# Patient Record
Sex: MALE | Race: WHITE | NOT HISPANIC OR LATINO | Employment: FULL TIME | URBAN - METROPOLITAN AREA
[De-identification: names, ages, dates, MRNs, and addresses within clinical notes are randomized per-mention and may not be internally consistent; named-entity substitution may affect disease eponyms.]

---

## 2017-01-30 ENCOUNTER — HOSPITAL ENCOUNTER (OUTPATIENT)
Dept: RADIOLOGY | Facility: HOSPITAL | Age: 22
Discharge: HOME | End: 2017-01-30
Attending: NURSE PRACTITIONER
Payer: COMMERCIAL

## 2017-01-30 ENCOUNTER — CLINICAL SUPPORT (OUTPATIENT)
Dept: CARDIOLOGY | Facility: CLINIC | Age: 22
End: 2017-01-30
Payer: COMMERCIAL

## 2017-01-30 ENCOUNTER — OFFICE VISIT (OUTPATIENT)
Dept: INTERNAL MEDICINE | Facility: CLINIC | Age: 22
End: 2017-01-30
Payer: COMMERCIAL

## 2017-01-30 VITALS
SYSTOLIC BLOOD PRESSURE: 118 MMHG | RESPIRATION RATE: 19 BRPM | TEMPERATURE: 98 F | WEIGHT: 313.25 LBS | BODY MASS INDEX: 43.85 KG/M2 | HEIGHT: 71 IN | HEART RATE: 72 BPM | DIASTOLIC BLOOD PRESSURE: 72 MMHG

## 2017-01-30 DIAGNOSIS — Z01.818 PREOP EXAMINATION: ICD-10-CM

## 2017-01-30 DIAGNOSIS — J45.909 UNCOMPLICATED ASTHMA, UNSPECIFIED ASTHMA SEVERITY: Chronic | ICD-10-CM

## 2017-01-30 DIAGNOSIS — E66.01 MORBID OBESITY WITH BMI OF 40.0-44.9, ADULT: ICD-10-CM

## 2017-01-30 DIAGNOSIS — E11.9 TYPE 2 DIABETES MELLITUS WITHOUT COMPLICATION, WITHOUT LONG-TERM CURRENT USE OF INSULIN: ICD-10-CM

## 2017-01-30 DIAGNOSIS — G47.33 OSA (OBSTRUCTIVE SLEEP APNEA): ICD-10-CM

## 2017-01-30 DIAGNOSIS — I10 ESSENTIAL HYPERTENSION: Chronic | ICD-10-CM

## 2017-01-30 DIAGNOSIS — S43.431A TEAR OF RIGHT GLENOID LABRUM, INITIAL ENCOUNTER: ICD-10-CM

## 2017-01-30 DIAGNOSIS — F33.1 MAJOR DEPRESSIVE DISORDER, RECURRENT EPISODE, MODERATE: Primary | ICD-10-CM

## 2017-01-30 DIAGNOSIS — G43.019 INTRACTABLE MIGRAINE WITHOUT AURA AND WITHOUT STATUS MIGRAINOSUS: ICD-10-CM

## 2017-01-30 PROCEDURE — 3074F SYST BP LT 130 MM HG: CPT | Mod: S$GLB,,, | Performed by: NURSE PRACTITIONER

## 2017-01-30 PROCEDURE — 3078F DIAST BP <80 MM HG: CPT | Mod: S$GLB,,, | Performed by: NURSE PRACTITIONER

## 2017-01-30 PROCEDURE — 99213 OFFICE O/P EST LOW 20 MIN: CPT | Mod: S$GLB,,, | Performed by: NURSE PRACTITIONER

## 2017-01-30 PROCEDURE — 93000 ELECTROCARDIOGRAM COMPLETE: CPT | Mod: S$GLB,,, | Performed by: NUCLEAR MEDICINE

## 2017-01-30 PROCEDURE — 99999 PR PBB SHADOW E&M-EST. PATIENT-LVL V: CPT | Mod: PBBFAC,,, | Performed by: NURSE PRACTITIONER

## 2017-01-30 PROCEDURE — 3060F POS MICROALBUMINURIA REV: CPT | Mod: S$GLB,,, | Performed by: NURSE PRACTITIONER

## 2017-01-30 PROCEDURE — 71020 XR CHEST PA AND LATERAL: CPT | Mod: 26,,, | Performed by: RADIOLOGY

## 2017-01-30 PROCEDURE — 71020 XR CHEST PA AND LATERAL: CPT | Mod: TC,PO

## 2017-01-30 PROCEDURE — 3046F HEMOGLOBIN A1C LEVEL >9.0%: CPT | Mod: S$GLB,,, | Performed by: NURSE PRACTITIONER

## 2017-01-30 PROCEDURE — 1159F MED LIST DOCD IN RCRD: CPT | Mod: S$GLB,,, | Performed by: NURSE PRACTITIONER

## 2017-01-30 NOTE — MR AVS SNAPSHOT
[facility] Medicine  [address] [place] [address]  Phone: [phone]  Fax: [phone]                  Wilver Saba   2017 9:40 AM   Office Visit    Description:  Male : 1995   Provider:  IVANIA Ha   Department:  [facility] - Internal Medicine           Reason for Visit     Pre-op Exam     Shoulder Pain           Diagnoses this Visit        Comments    Major depressive disorder, recurrent episode, moderate    -  Primary     TAO (obstructive sleep apnea)         Uncomplicated asthma, unspecified asthma severity         Essential hypertension         Tear of right glenoid labrum, initial encounter         Preop examination                To Do List           Future Appointments        Provider Department Dept Phone    2017 11:15 AM [facility] XR2 [facility] [phone]    2017 [facility] [phone]    2017 1:30 PM LAB, SAME DAY [facility] [phone]    2017 1:00 PM Elizabeth Lejeune, NP [facility] Pulmonary Services [phone]    10/10/2017 9:30 AM FIELDS, VISUAL-ONE [facility] Ophthalmology [phone]      Goals (5 Years of Data)     None      Follow-Up and Disposition     Return if symptoms worsen or fail to improve.      [facility]     [facility] Care [facility] - 24/ Assistance  Registered nurses in the [facility] provide clinical advisement, health education, appointment booking, and other advisory services.  Call for this free service at [phone].             Medications           Message regarding Medications     Verify the changes and/or additions to your medication regime listed below are the same as discussed with your clinician today.  If any of these changes or additions are incorrect, please notify your healthcare provider.             Verify that the below list of medications is an accurate representation of the medications you are  currently taking.  If none reported, the list may be blank. If incorrect, please contact your healthcare provider. Carry this list with you in case of emergency.           Current Medications     albuterol 90 mcg/actuation HFAA Inhale into the lungs. 2 HFA Aerosol Inhaler Inhalation Every 4 hours    amlodipine (NORVASC) 10 MG tablet Take 1 tablet (10 mg total) by mouth once daily. 1 Tablet Oral Every day    busPIRone (BUSPAR) 5 MG Tab Take 5 mg by mouth 2 (two) times daily.    clindamycin (CLEOCIN T) 1 % lotion AAA of lower legs bid    desvenlafaxine succinate (PRISTIQ) 50 MG Tb24 Take 100 mg by mouth once daily.    econazole nitrate 1 % cream AAA bid    ibuprofen-famotidine 800-26.6 mg Tab Take 1 tablet by mouth 2 (two) times daily as needed.    ketoconazole (NIZORAL) 2 % cream AAA bid    losartan-hydrochlorothiazide 100-25 mg (HYZAAR) 100-25 mg per tablet Take 1 tablet by mouth once daily.    metformin (GLUCOPHAGE) 1000 MG tablet Take by mouth. 1 Tablet Oral Twice a day .  Do not take until 6/23    miconazole NITRATE 2 % (ZEASORB AF) 2 % top powder Use two to three times daily to prevent rash    mupirocin (BACTROBAN) 2 % ointment AAA bid for 10 days    mupirocin (BACTROBAN) 2 % ointment AAA bid for 10 days    sumatriptan (IMITREX) 100 MG tablet Take 1 tab at onset of migraine.  May repeat 1 tab in 2 hours.  Limit to 3 tabs/week.    topiramate (TOPAMAX) 15 MG capsule Take 1 cap q d for 7d, then 2 caps q d for 7d, then switch to 50 mg tab    topiramate (TOPAMAX) 50 MG tablet Take 1 tablet (50 mg total) by mouth once daily.    tramadol (ULTRAM) 50 mg tablet Take 50 mg by mouth every 6 (six) hours as needed for Pain.    triamcinolone acetonide 0.025% (KENALOG) 0.025 % cream AAA bid as needed for flares.  Mild steroid.    triamcinolone acetonide 0.1% (KENALOG) 0.1 % cream Apply topically 2 (two) times daily. Apply to chest eczema    zolpidem (AMBIEN) 10 mg Tab Take 10 mg by mouth every evening.           Clinical  "Reference Information           Vital Signs - Last Recorded  Most recent update: 1/30/2017 10:20 AM by Shelly [address] LPN    BP Pulse Temp Resp    118/72 (BP Location: Right arm, Patient Position: Sitting, BP Method: Manual) 72 97.6 °F (36.4 °C) (Tympanic) 19    Ht Wt BMI    5' 11" (1.803 m) (!) 142.1 kg (313 lb 4.4 oz) 43.69 kg/m2      Blood Pressure          Most Recent Value    BP  118/72      Allergies as of 1/30/2017     Amoxicillin    Amoxicillin-pot Clavulanate    Cefprozil    Cephalosporins    Diphenhydramine    Diphenhydramine Hcl    Penicillins    Potassium Clavulanate    Latex, Natural Rubber      Immunizations Administered on Date of Encounter - 1/30/2017     None      Orders Placed During Today's Visit      Normal Orders This Visit    Ambulatory referral to Pulmonology     Future Labs/Procedures Expected by Expires    APTT  1/30/2017 3/31/2018    CBC auto differential  1/30/2017 3/31/2018    Protime-INR  1/30/2017 4/30/2017    Urinalysis  1/30/2017 4/30/2017    X-Ray Chest PA And Lateral  1/30/2017 1/30/2018    Comprehensive metabolic panel  7/29/2017 1/30/2018    EKG 12-lead  As directed 1/30/2018      "
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
## 2017-01-31 ENCOUNTER — PROCEDURE VISIT (OUTPATIENT)
Dept: PULMONOLOGY | Facility: CLINIC | Age: 22
End: 2017-01-31
Payer: COMMERCIAL

## 2017-01-31 ENCOUNTER — TELEPHONE (OUTPATIENT)
Dept: INTERNAL MEDICINE | Facility: CLINIC | Age: 22
End: 2017-01-31

## 2017-01-31 ENCOUNTER — OFFICE VISIT (OUTPATIENT)
Dept: PULMONOLOGY | Facility: CLINIC | Age: 22
End: 2017-01-31
Payer: COMMERCIAL

## 2017-01-31 VITALS
SYSTOLIC BLOOD PRESSURE: 140 MMHG | BODY MASS INDEX: 44.1 KG/M2 | HEIGHT: 71 IN | HEART RATE: 75 BPM | OXYGEN SATURATION: 98 % | DIASTOLIC BLOOD PRESSURE: 98 MMHG | RESPIRATION RATE: 18 BRPM | WEIGHT: 315 LBS

## 2017-01-31 DIAGNOSIS — G47.33 OSA (OBSTRUCTIVE SLEEP APNEA): ICD-10-CM

## 2017-01-31 DIAGNOSIS — Z01.811 PRE-OP CHEST EXAM: ICD-10-CM

## 2017-01-31 DIAGNOSIS — J45.20 MILD INTERMITTENT ASTHMA WITHOUT COMPLICATION: ICD-10-CM

## 2017-01-31 DIAGNOSIS — J45.20 MILD INTERMITTENT ASTHMA WITHOUT COMPLICATION: Primary | ICD-10-CM

## 2017-01-31 LAB
ALLENS TEST: ABNORMAL
ALLENS TEST: ABNORMAL
ALLENS TEST: NORMAL
DELSYS: ABNORMAL
DELSYS: ABNORMAL
DELSYS: NORMAL
HCO3 UR-SCNC: 24.5 MMOL/L (ref 24–28)
HCO3 UR-SCNC: 25.4 MMOL/L (ref 24–28)
HCO3 UR-SCNC: 25.4 MMOL/L (ref 24–28)
PCO2 BLDA: 33.8 MMHG (ref 35–45)
PCO2 BLDA: 34.4 MMHG (ref 35–45)
PCO2 BLDA: 39.6 MMHG (ref 35–45)
PH SMN: 7.42 [PH] (ref 7.35–7.45)
PH SMN: 7.47 [PH] (ref 7.35–7.45)
PH SMN: 7.48 [PH] (ref 7.35–7.45)
PO2 BLDA: 112 MMHG (ref 80–100)
PO2 BLDA: 114 MMHG (ref 80–100)
PO2 BLDA: 90 MMHG (ref 80–100)
POC BE: 1 MMOL/L
POC BE: 1 MMOL/L
POC BE: 2 MMOL/L
POC SATURATED O2: 97 % (ref 95–100)
POC SATURATED O2: 99 % (ref 95–100)
POC SATURATED O2: 99 % (ref 95–100)
POST FEF 25 75: 4.44 L/S (ref 4.09–5.72)
POST FET 100: 7.42 S
POST FEV1 FVC: 90 %
POST FEV1: 3.88 L (ref 4.33–5.13)
POST FIF 50: 9.55 L/S
POST FVC: 4.32 L (ref 5.24–6.2)
POST PEF: 5.88 L/S (ref 9.17–11.54)
PRE FEF 25 75: 3.24 L/S (ref 4.09–5.72)
PRE FET 100: 7.59 S
PRE FEV1 FVC: 78 %
PRE FEV1: 3.52 L (ref 4.33–5.13)
PRE FIF 50: 8.39 L/S
PRE FVC: 4.52 L (ref 5.24–6.2)
PRE PEF: 5.2 L/S (ref 9.17–11.54)
PREDICTED FEV1 FVC: 83.31 % (ref 78.48–88.15)
PREDICTED FEV1: 4.73 L (ref 4.33–5.13)
PREDICTED FVC: 5.72 L (ref 5.24–6.2)
PROVOCATION PROTOCOL: ABNORMAL
SAMPLE: ABNORMAL
SAMPLE: ABNORMAL
SAMPLE: NORMAL
SITE: ABNORMAL
SITE: ABNORMAL
SITE: NORMAL

## 2017-01-31 PROCEDURE — 36600 WITHDRAWAL OF ARTERIAL BLOOD: CPT | Mod: S$GLB,,, | Performed by: INTERNAL MEDICINE

## 2017-01-31 PROCEDURE — 94060 EVALUATION OF WHEEZING: CPT | Mod: S$GLB,,, | Performed by: INTERNAL MEDICINE

## 2017-01-31 PROCEDURE — 82803 BLOOD GASES ANY COMBINATION: CPT | Mod: S$GLB,,, | Performed by: INTERNAL MEDICINE

## 2017-01-31 PROCEDURE — 99214 OFFICE O/P EST MOD 30 MIN: CPT | Mod: S$GLB,,, | Performed by: NURSE PRACTITIONER

## 2017-01-31 PROCEDURE — 1159F MED LIST DOCD IN RCRD: CPT | Mod: S$GLB,,, | Performed by: NURSE PRACTITIONER

## 2017-01-31 PROCEDURE — 3077F SYST BP >= 140 MM HG: CPT | Mod: S$GLB,,, | Performed by: NURSE PRACTITIONER

## 2017-01-31 PROCEDURE — 99999 PR PBB SHADOW E&M-EST. PATIENT-LVL IV: CPT | Mod: PBBFAC,,, | Performed by: NURSE PRACTITIONER

## 2017-01-31 PROCEDURE — 3080F DIAST BP >= 90 MM HG: CPT | Mod: S$GLB,,, | Performed by: NURSE PRACTITIONER

## 2017-01-31 RX ORDER — ALBUTEROL SULFATE 90 UG/1
2 AEROSOL, METERED RESPIRATORY (INHALATION) EVERY 4 HOURS PRN
Qty: 18 G | Refills: 0 | Status: SHIPPED | OUTPATIENT
Start: 2017-01-31 | End: 2017-03-27 | Stop reason: SDUPTHER

## 2017-01-31 NOTE — TELEPHONE ENCOUNTER
----- Message from Theodora Nix sent at 1/31/2017  9:01 AM CST -----  Contact: sudha ( Dr Stearns )   sudha ( Dr Stearns ) is requesting a copy of pt pre op results. Pt wll have surgery on 2/1/2017            Please call sudha Stearns ) back at [phone] ( office ) [phone] ( fax)
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
## 2017-01-31 NOTE — LETTER
January 31, 2017                   [facility] - Pul[facility] Services  [address] [facility] [address]  [place] [address]  Phone: [phone]  Fax: [phone] January 31, 2017     Patient: Wilver Saba    YOB: 1995   Date of Visit: 1/31/2017       Dr. Henry:    It is my medical opinion that Wilver Saba low risk for post op complications from a pulmonary standpoint. Please have CPAP 10cm available.    If you have any questions or concerns, please don't hesitate to call.    Sincerely,        Elizabeth Lejeune, NP

## 2017-02-02 DIAGNOSIS — I10 ESSENTIAL HYPERTENSION: ICD-10-CM

## 2017-02-02 RX ORDER — AMLODIPINE BESYLATE 10 MG/1
10 TABLET ORAL DAILY
Qty: 30 TABLET | Refills: 11 | Status: SHIPPED | OUTPATIENT
Start: 2017-02-02 | End: 2017-03-28 | Stop reason: SDUPTHER

## 2017-02-02 RX ORDER — LOSARTAN POTASSIUM AND HYDROCHLOROTHIAZIDE 25; 100 MG/1; MG/1
1 TABLET ORAL DAILY
Qty: 30 TABLET | Refills: 11 | Status: SHIPPED | OUTPATIENT
Start: 2017-02-02 | End: 2017-03-28 | Stop reason: SDUPTHER

## 2017-02-02 NOTE — TELEPHONE ENCOUNTER
----- Message from Melany Pressley sent at 2/2/2017 11:39 AM CST -----  Contact: pt  Pt is calling nurse staff regarding a refill Rx for amlodipine, losartan. Pt call back be advise [phone] thanks

## 2017-03-28 DIAGNOSIS — I10 ESSENTIAL HYPERTENSION: ICD-10-CM

## 2017-03-28 RX ORDER — ALBUTEROL SULFATE 90 UG/1
AEROSOL, METERED RESPIRATORY (INHALATION)
Qty: 36 INHALER | Refills: 0 | Status: SHIPPED | OUTPATIENT
Start: 2017-03-28

## 2017-03-28 NOTE — TELEPHONE ENCOUNTER
----- Message from Theodora Nix sent at 3/28/2017  2:19 PM CDT -----  Pt is requesting a refill on all his medications. Pt states he is completely out of all his medications.              Please call pt back at [phone]        [facility] [address] [facility] - [address] - [address]  [address] [place] [address]  Phone: [phone] Fax: [phone]

## 2017-03-30 ENCOUNTER — TELEPHONE (OUTPATIENT)
Dept: INTERNAL MEDICINE | Facility: CLINIC | Age: 22
End: 2017-03-30

## 2017-03-30 NOTE — TELEPHONE ENCOUNTER
----- Message from Mehreen Conway sent at 3/30/2017  2:58 PM CDT -----  Contact: Margot - Mom  She states that his generic Metformin 1000 mgs twice a day was not called in to [facility] on [address].  They told her they never received that one.   Call Mom at [phone].                                [place]

## 2017-03-31 RX ORDER — METFORMIN HYDROCHLORIDE 1000 MG/1
TABLET ORAL
Qty: 30 TABLET | Refills: 0 | Status: SHIPPED | OUTPATIENT
Start: 2017-03-31 | End: 2017-06-23 | Stop reason: SDUPTHER

## 2017-03-31 RX ORDER — AMLODIPINE BESYLATE 10 MG/1
10 TABLET ORAL DAILY
Qty: 90 TABLET | Refills: 0 | Status: SHIPPED | OUTPATIENT
Start: 2017-03-31

## 2017-03-31 RX ORDER — LOSARTAN POTASSIUM AND HYDROCHLOROTHIAZIDE 25; 100 MG/1; MG/1
1 TABLET ORAL DAILY
Qty: 90 TABLET | Refills: 0 | Status: SHIPPED | OUTPATIENT
Start: 2017-03-31 | End: 2017-08-09 | Stop reason: SDUPTHER

## 2017-05-06 ENCOUNTER — HOSPITAL ENCOUNTER (EMERGENCY)
Facility: HOSPITAL | Age: 22
Discharge: HOME | End: 2017-05-06
Attending: EMERGENCY MEDICINE
Payer: COMMERCIAL

## 2017-05-06 VITALS
DIASTOLIC BLOOD PRESSURE: 100 MMHG | HEIGHT: 71 IN | TEMPERATURE: 98 F | BODY MASS INDEX: 42.7 KG/M2 | RESPIRATION RATE: 20 BRPM | WEIGHT: 305 LBS | SYSTOLIC BLOOD PRESSURE: 159 MMHG | OXYGEN SATURATION: 96 % | HEART RATE: 78 BPM

## 2017-05-06 DIAGNOSIS — R79.89 ELEVATED LFTS: ICD-10-CM

## 2017-05-06 DIAGNOSIS — K57.90 DIVERTICULOSIS OF INTESTINE WITHOUT BLEEDING, UNSPECIFIED INTESTINAL TRACT LOCATION: ICD-10-CM

## 2017-05-06 DIAGNOSIS — N30.10 INTERSTITIAL CYSTITIS: ICD-10-CM

## 2017-05-06 DIAGNOSIS — R10.9 ABDOMINAL PAIN, UNSPECIFIED LOCATION: Primary | ICD-10-CM

## 2017-05-06 LAB
ALBUMIN SERPL BCP-MCNC: 4 G/DL
ALP SERPL-CCNC: 20 U/L
ALT SERPL W/O P-5'-P-CCNC: 78 U/L
ANION GAP SERPL CALC-SCNC: 12 MMOL/L
AST SERPL-CCNC: 44 U/L
BASOPHILS # BLD AUTO: 0.01 K/UL
BASOPHILS NFR BLD: 0.1 %
BILIRUB SERPL-MCNC: 0.5 MG/DL
BILIRUB UR QL STRIP: NEGATIVE
BUN SERPL-MCNC: 17 MG/DL
CALCIUM SERPL-MCNC: 9.2 MG/DL
CHLORIDE SERPL-SCNC: 103 MMOL/L
CLARITY UR: CLEAR
CO2 SERPL-SCNC: 22 MMOL/L
COLOR UR: YELLOW
CREAT SERPL-MCNC: 0.8 MG/DL
DIFFERENTIAL METHOD: ABNORMAL
EOSINOPHIL # BLD AUTO: 0.1 K/UL
EOSINOPHIL NFR BLD: 1.6 %
ERYTHROCYTE [DISTWIDTH] IN BLOOD BY AUTOMATED COUNT: 12.2 %
EST. GFR  (AFRICAN AMERICAN): >60 ML/MIN/1.73 M^2
EST. GFR  (NON AFRICAN AMERICAN): >60 ML/MIN/1.73 M^2
GLUCOSE SERPL-MCNC: 100 MG/DL
GLUCOSE UR QL STRIP: NEGATIVE
HCT VFR BLD AUTO: 40.1 %
HGB BLD-MCNC: 14.4 G/DL
HGB UR QL STRIP: ABNORMAL
KETONES UR QL STRIP: NEGATIVE
LEUKOCYTE ESTERASE UR QL STRIP: NEGATIVE
LIPASE SERPL-CCNC: 18 U/L
LYMPHOCYTES # BLD AUTO: 2.4 K/UL
LYMPHOCYTES NFR BLD: 28.4 %
MCH RBC QN AUTO: 31.8 PG
MCHC RBC AUTO-ENTMCNC: 35.9 %
MCV RBC AUTO: 89 FL
MONOCYTES # BLD AUTO: 0.5 K/UL
MONOCYTES NFR BLD: 5.9 %
NEUTROPHILS # BLD AUTO: 5.4 K/UL
NEUTROPHILS NFR BLD: 64 %
NITRITE UR QL STRIP: NEGATIVE
PH UR STRIP: 6 [PH] (ref 5–8)
PLATELET # BLD AUTO: ABNORMAL K/UL
PMV BLD AUTO: ABNORMAL FL
POTASSIUM SERPL-SCNC: 4.3 MMOL/L
PROT SERPL-MCNC: 7.5 G/DL
PROT UR QL STRIP: NEGATIVE
RBC # BLD AUTO: 4.53 M/UL
SODIUM SERPL-SCNC: 137 MMOL/L
SP GR UR STRIP: 1.01 (ref 1–1.03)
URN SPEC COLLECT METH UR: ABNORMAL
UROBILINOGEN UR STRIP-ACNC: NEGATIVE EU/DL
WBC # BLD AUTO: 8.49 K/UL

## 2017-05-06 PROCEDURE — 85025 COMPLETE CBC W/AUTO DIFF WBC: CPT

## 2017-05-06 PROCEDURE — 99284 EMERGENCY DEPT VISIT MOD MDM: CPT | Mod: 25

## 2017-05-06 PROCEDURE — 96361 HYDRATE IV INFUSION ADD-ON: CPT

## 2017-05-06 PROCEDURE — 96375 TX/PRO/DX INJ NEW DRUG ADDON: CPT

## 2017-05-06 PROCEDURE — 80053 COMPREHEN METABOLIC PANEL: CPT

## 2017-05-06 PROCEDURE — 96374 THER/PROPH/DIAG INJ IV PUSH: CPT

## 2017-05-06 PROCEDURE — 25500020 PHARM REV CODE 255: Performed by: EMERGENCY MEDICINE

## 2017-05-06 PROCEDURE — 81003 URINALYSIS AUTO W/O SCOPE: CPT

## 2017-05-06 PROCEDURE — 25000003 PHARM REV CODE 250: Performed by: EMERGENCY MEDICINE

## 2017-05-06 PROCEDURE — 83690 ASSAY OF LIPASE: CPT

## 2017-05-06 PROCEDURE — 63600175 PHARM REV CODE 636 W HCPCS: Performed by: EMERGENCY MEDICINE

## 2017-05-06 RX ORDER — ONDANSETRON 2 MG/ML
4 INJECTION INTRAMUSCULAR; INTRAVENOUS
Status: COMPLETED | OUTPATIENT
Start: 2017-05-06 | End: 2017-05-06

## 2017-05-06 RX ORDER — HYDROCODONE BITARTRATE AND ACETAMINOPHEN 7.5; 325 MG/1; MG/1
1 TABLET ORAL EVERY 6 HOURS PRN
COMMUNITY

## 2017-05-06 RX ORDER — CIPROFLOXACIN 500 MG/1
500 TABLET ORAL 2 TIMES DAILY
Qty: 20 TABLET | Refills: 0 | Status: SHIPPED | OUTPATIENT
Start: 2017-05-06 | End: 2017-05-16

## 2017-05-06 RX ORDER — NAPROXEN 500 MG/1
500 TABLET ORAL 2 TIMES DAILY WITH MEALS
Qty: 20 TABLET | Refills: 0 | Status: SHIPPED | OUTPATIENT
Start: 2017-05-06

## 2017-05-06 RX ORDER — MORPHINE SULFATE 4 MG/ML
4 INJECTION, SOLUTION INTRAMUSCULAR; INTRAVENOUS
Status: COMPLETED | OUTPATIENT
Start: 2017-05-06 | End: 2017-05-06

## 2017-05-06 RX ADMIN — MORPHINE SULFATE 4 MG: 4 INJECTION, SOLUTION INTRAMUSCULAR; INTRAVENOUS at 04:05

## 2017-05-06 RX ADMIN — ONDANSETRON 4 MG: 2 INJECTION INTRAMUSCULAR; INTRAVENOUS at 04:05

## 2017-05-06 RX ADMIN — IOHEXOL 75 ML: 350 INJECTION, SOLUTION INTRAVENOUS at 05:05

## 2017-05-06 RX ADMIN — SODIUM CHLORIDE 1000 ML: 0.9 INJECTION, SOLUTION INTRAVENOUS at 04:05

## 2017-05-06 NOTE — ED AVS SNAPSHOT
[facility]  [address]  [place] [facility] [address]               Wilver Saba   2017  3:01 AM   ED    Description:  Male : 1995   Department:  [facility]            Your Care was Coordinated By:     Provider Role From To    Yuriy Smith Jr., MD Attending Provider 17 0250 --      Reason for Visit     Abdominal Pain           Diagnoses this Visit        Comments    Abdominal pain, unspecified location    -  Primary     Elevated LFTs         Interstitial cystitis         Diverticulosis of intestine without bleeding, unspecified intestinal tract location           ED Disposition     ED Disposition Condition Comment    Discharge  For URINARY TRACT INFECTION, I instructed patient to avoid holding in urine and recommended that he urinate when he feels the urge.  Also advised patient to drink plenty of liquids and reminded patient that he may need to drink more fluids than usual to  help flush out the bacteria. Instructed patient to avoid alcohol, caffeine, and citrus juices as these substances can irritate your bladder and increase your symptoms.  Also recommended that patient apply heat to lower abdomen for 20 to 30 minutes every  two hours to help decrease discomfort and pressure in the bladder.    Return to ER if symptoms persist or worsen.             To Do List           Follow-up Information     Follow up with Vin Thomson MD. Schedule an appointment as soon as possible for a visit in 1 week.    Specialty:  Family Medicine    Contact information:    [address]  [place] [address] [phone]          Follow up with [facility]. Schedule an appointment as soon as possible for a visit in 1 week.    Specialty:  Urology    Contact information:    [address] [address] [phone]    Additional information:    (off [address] [facility]) 4th floor        Follow up with [facility]  [place]    Specialty:  Emergency Medicine    Why:  As needed, If symptoms worsen    Contact information:    [address]  [facility] [address] [phone]       These Medications        Disp Refills Start End    ciprofloxacin HCl (CIPRO) 500 MG tablet 20 tablet 0 5/6/2017 5/16/2017    Take 1 tablet (500 mg total) by mouth 2 (two) times daily. - Oral    Pharmacy: [id number] - [place] - [id number] Ph #: [phone]       naproxen (NAPROSYN) 500 MG tablet 20 tablet 0 5/6/2017     Take 1 tablet (500 mg total) by mouth 2 (two) times daily with meals. - Oral    Pharmacy: [id number] [place] - [id number] [address] Ph #: [phone]         [facility]     [facility] Care Line - 24/7 Assistance  Unless otherwise directed by your provider, please contact [facility], our nurse care line that is available for 24/7 assistance.     Registered nurses in the [facility] provide: appointment scheduling, clinical advisement, health education, and other advisory services.  Call: [phone] (toll free)               Medications           Message regarding Medications     Verify the changes and/or additions to your medication regime listed below are the same as discussed with your clinician today.  If any of these changes or additions are incorrect, please notify your healthcare provider.        START taking these NEW medications        Refills    ciprofloxacin HCl (CIPRO) 500 MG tablet 0    Sig: Take 1 tablet (500 mg total) by mouth 2 (two) times daily.    Class: Print    Route: Oral    naproxen (NAPROSYN) 500 MG tablet 0    Sig: Take 1 tablet (500 mg total) by mouth 2 (two) times daily with meals.    Class: Print    Route: Oral      These medications were administered today        Dose Freq    morphine injection 4 mg 4 mg ED 1 Time    Sig: Inject 1 mL (4 mg total) into the vein ED 1 Time.    Class: Normal    Route: Intravenous     ondansetron injection 4 mg 4 mg ED 1 Time    Sig: Inject 4 mg into the vein ED 1 Time.    Class: Normal    Route: Intravenous    sodium chloride 0.9% bolus 1,000 mL 1,000 mL ED 1 Time    Sig: Inject 1,000 mLs into the vein ED 1 Time.    Class: Normal    Route: Intravenous    omnipaque 350 iohexol 75 mL 75 mL IMG once as needed    Sig: Inject 75 mLs into the vein ONCE PRN for contrast.    Class: Normal    Route: Intravenous           Verify that the below list of medications is an accurate representation of the medications you are currently taking.  If none reported, the list may be blank. If incorrect, please contact your healthcare provider. Carry this list with you in case of emergency.           Current Medications     amlodipine (NORVASC) 10 MG tablet Take 1 tablet (10 mg total) by mouth once daily. 1 Tablet Oral Every day    busPIRone (BUSPAR) 5 MG Tab Take 5 mg by mouth 2 (two) times daily.    desvenlafaxine succinate (PRISTIQ) 50 MG Tb24 Take 100 mg by mouth once daily.    econazole nitrate 1 % cream AAA bid    hydrocodone-acetaminophen 7.5-325mg (NORCO) 7.5-325 mg per tablet Take 1 tablet by mouth every 6 (six) hours as needed for Pain.    losartan-hydrochlorothiazide 100-25 mg (HYZAAR) 100-25 mg per tablet Take 1 tablet by mouth once daily.    metformin (GLUCOPHAGE) 1000 MG tablet 1 Tablet Oral Twice a day .    VENTOLIN HFA 90 mcg/actuation inhaler INHALE 2 PUFFS BY MOUTH EVERY 4 HOURS AS NEEDED FOR WHEEZING.    zolpidem (AMBIEN) 10 mg Tab Take 10 mg by mouth every evening.    ciprofloxacin HCl (CIPRO) 500 MG tablet Take 1 tablet (500 mg total) by mouth 2 (two) times daily.    clindamycin (CLEOCIN T) 1 % lotion AAA of lower legs bid    ibuprofen-famotidine 800-26.6 mg Tab Take 1 tablet by mouth 2 (two) times daily as needed.    ketoconazole (NIZORAL) 2 % cream AAA bid    miconazole NITRATE 2 % (ZEASORB AF) 2 % top powder Use two to three times daily to prevent rash    mupirocin (BACTROBAN) 2 % ointment AAA  "bid for 10 days    mupirocin (BACTROBAN) 2 % ointment AAA bid for 10 days    naproxen (NAPROSYN) 500 MG tablet Take 1 tablet (500 mg total) by mouth 2 (two) times daily with meals.    sumatriptan (IMITREX) 100 MG tablet Take 1 tab at onset of migraine.  May repeat 1 tab in 2 hours.  Limit to 3 tabs/week.    topiramate (TOPAMAX) 15 MG capsule Take 1 cap q d for 7d, then 2 caps q d for 7d, then switch to 50 mg tab    tramadol (ULTRAM) 50 mg tablet Take 50 mg by mouth every 6 (six) hours as needed for Pain.    triamcinolone acetonide 0.025% (KENALOG) 0.025 % cream AAA bid as needed for flares.  Mild steroid.    triamcinolone acetonide 0.1% (KENALOG) 0.1 % cream Apply topically 2 (two) times daily. Apply to chest eczema           Clinical Reference Information           Your Vitals Were     BP Pulse Temp Resp Height Weight    190/89 91 97.5 °F (36.4 °C) 20 5' 11" (1.803 m) 138.3 kg (305 lb)    SpO2 BMI             98% 42.54 kg/m2         Allergies as of 5/6/2017        Reactions    Amoxicillin Other (See Comments)    Amoxicillin-pot Clavulanate Other (See Comments)    Other reaction(s): Swelling  Other reaction(s): Hives  Other reaction(s): Flushing (skin)    Cefprozil Other (See Comments)    Cephalosporins     Other reaction(s): Flushing (skin)    Diphenhydramine Other (See Comments)    Diphenhydramine Hcl Other (See Comments)    Other reaction(s): Agitation    Penicillins Other (See Comments)    Other reaction(s): RED BURNING SKIN    Potassium Clavulanate Other (See Comments)    Latex, Natural Rubber Rash      Immunizations Administered on Date of Encounter - 5/6/2017     None      ED Micro, Lab, POCT     Start Ordered       Status Ordering Provider    05/06/17 0316 05/06/17 0319  CBC W/ AUTO DIFFERENTIAL  STAT      Final result     05/06/17 0316 05/06/17 0319  Comp. Metabolic Panel  STAT      Final result     05/06/17 0316 05/06/17 0319  Lipase  Once      Final result     05/06/17 0316 05/06/17 0319  Urinalysis - " Clean Catch  STAT      Final result       ED Imaging Orders     Start Ordered       Status Ordering Provider    05/06/17 0318 05/06/17 0319  CT Abdomen Pelvis With Contrast  1 time imaging      In process         Discharge Instructions         Diverticulosis    Diverticulosis means that small pouches have formed in the wall of your large intestine (colon). Most often, this problem causes no symptoms and is common as people age. But the pouches in the colon are at risk of becoming infected. When this happens, the condition is called diverticulitis. Although most people with diverticulosis never develop diverticulitis, it is still not uncommon. Rectal bleeding can also occur and in less common situations, a type of colon inflammation called colitis.  While most people do not have symptoms, some people with diverticulosis may have:  · Abdominal cramps and pain  · Bloating  · Constipation  · Change in bowel habits  Causes  The exact cause of diverticulosis (and diverticulitis) has not been proved, but a few things are associated with the condition:  · Low-fiber diet  · Constipation  · Lack of exercise  Your healthcare provider will talk with you about how to manage your condition. Diet changes may be all that are needed to help control diverticulosis and prevent progression to diverticulitis. If you develop diverticulitis, you will likely need other treatments.  Home care  You may be told to take fiber supplements daily. Fiber adds bulk to the stool so that it passes through the colon more easily. Stool softeners may be recommended. You may also be given medications for pain relief. Be sure to take all medications as directed.  In the past, people were told to avoid corn, nuts, and seeds. This is no longer necessary.  Follow these guidelines when caring for yourself at home:  · Eat unprocessed foods that are high in fiber. Whole grains, fruits, and vegetables are good choices.  · Drink 6 to 8 glasses of water every day  unless your healthcare provider has you limit how much fluid you should have.  · Watch for changes in your bowel movements. Tell your provider if you notice any changes.  · Begin an exercise program. Ask your provider how to get started. Generally, walking is the best.  · Get plenty of rest and sleep.  Follow-up care  Follow up with your healthcare provider, or as advised. Regular visits may be needed to check on your health. Sometimes special procedures such as colonoscopy, are needed after an episode of diverticulitis or blooding. Be sure to keep all your appointments.  If a stool sample was taken, or cultures were done, you should be told if they are positive, or if your treatment needs to be changed. You can call as directed for the results.  If X-rays were done, a radiologist will look at them. You will be told if there is a change in your treatment.  If antibiotics were prescribed, be sure to finish them all.  When to seek medical advice  Call your healthcare provider right away if any of these occur:  · Fever of 100.4°F (38°C) or higher, or as directed by your healthcare provider  · Severe cramps in the lower left side of the abdomen or pain that is getting worse  · Tenderness in the lower left side of the abdomen or worsening pain throughout the abdomen  · Diarrhea or constipation that doesn't get better within 24 hours  · Nausea and vomiting  · Bleeding from the rectum  Call 911  Call emergency services if any of the following occur:  · Trouble breathing  · Confusion  · Very drowsy or trouble awakening  · Fainting or loss of consciousness  · Rapid heart rate  · Chest pain  Date Last Reviewed: 12/30/2015  © 5800-6224 [employer], [employer]. [address] [place] [address], [place] [address]. All rights reserved. This information is not intended as a substitute for professional medical care. Always follow your healthcare professional's instructions.          Anatomy of the Male Urinary Tract  Your urinary tract  helps to get rid of your bodys liquid waste. The kidneys constantly filter the blood to collect unneeded chemicals and water, making urine. Urine travels through the ureters to the bladder. The bladder fills with urine, holding it until youre ready to release it. Signals from the brain tell the sphincter (muscles around the opening of the bladder) when to let urine flow out of the bladder. The urethra is the tube that carries urine from the bladder out of the body. In men, the prostate gland wraps around the urethra near the bladder.     Front view of male urinary tract.     Date Last Reviewed: 8/27/2014  © 0567-9007 [employer]. [address] [place] [address], [place] [address]. All rights reserved. This information is not intended as a substitute for professional medical care. Always follow your healthcare professional's instructions.          Your Scheduled Appointments     Oct 10, 2017  9:30 AM CDT   [facility] with [facility]ELDS, VISUAL-ONE   [facility] ([facility])    [address] [place] [address] [phone]            Oct 10, 2017 10:00 AM CDT   Established Patient Visit with Richie Gifford OD   [facility] ([facility])    [address] [place] [address] [phone]               [facility]  complies with applicable Federal civil rights laws and does not discriminate on the basis of race, color, national origin, age, disability, or sex.        Language Assistance Services     ATTENTION: Language assistance services are available, free of charge. Please call [phone].      ATENCIÓN: Si habla español, tiene a dominguez disposición servicios gratuitos de asistencia lingüística. Llame al [phone].     CHÚ Ý: N?u b?n nói Ti?ng Vi?t, có các d?ch v? h? tr? ngôn ng? mi?n phí dành cho b?n. G?i s? [phone].

## 2017-05-06 NOTE — ED PROVIDER NOTES
SCRIBE #1 NOTE: I, Efrem Rock, am scribing for, and in the presence of, Yuriy Smith Jr., MD. I have scribed the entire note.      History      Chief Complaint   Patient presents with    Abdominal Pain       Review of patient's allergies indicates:   Allergen Reactions    Amoxicillin Other (See Comments)    Amoxicillin-pot clavulanate Other (See Comments)     Other reaction(s): Swelling  Other reaction(s): Hives  Other reaction(s): Flushing (skin)    Cefprozil Other (See Comments)    Cephalosporins      Other reaction(s): Flushing (skin)    Diphenhydramine Other (See Comments)    Diphenhydramine hcl Other (See Comments)     Other reaction(s): Agitation    Penicillins Other (See Comments)     Other reaction(s): RED BURNING SKIN    Potassium clavulanate Other (See Comments)    Latex, natural rubber Rash        HPI   HPI    5/6/2017, 3:05 AM   History obtained from the patient      History of Present Illness: Wilver Saba is a 22 y.o. male patient who presents to the Emergency Department for RLQ abd pain which onset gradually 11 hours ago while cooking dinner and worsened since onset. Symptoms are constant and moderate in severity. Pain is exacerbating by movement and deep breaths and does not radiate anywhere. No other mitigating or exacerbating factors reported. No alleviating factors reported. Associated sxs include nausea. Patient denies any fever, chills, vomiting, diarrhea, constipation, hematochezia, hematuria, dysuria, urinary frequency, and all other sxs at this time. Prior Tx includes Hydrocodone 750mg PTA. Pt has hx of appy and diabetes. No further complaints or concerns at this time.       Arrival mode: Personal vehicle     PCP: Vin Thomson MD       Past Medical History:  Past Medical History:   Diagnosis Date    ADHD (attention deficit hyperactivity disorder)     Anxiety     Asthma     Depression     dr torres    Diabetes mellitus 2010     08/20/2014    DM  (diabetes mellitus) 2010    BS  115 x 1 month ago 08/25/2015    Headache     Hypertension     Migraine headache     Morbid obesity     TAO (obstructive sleep apnea)     Type 2 diabetes with nephropathy        Past Surgical History:  Past Surgical History:   Procedure Laterality Date    APPENDECTOMY      FRACTURE SURGERY      SHOULDER ARTHROSCOPY W/ LABRAL REPAIR      TONSILLECTOMY           Family History:  Family History   Problem Relation Age of Onset    Hypertension Mother     Diabetes Mother     Glaucoma Mother     Strabismus Mother     Hypertension Father     Diabetes Father     Diabetes Brother     Strabismus Brother     Eczema Maternal Uncle     Eczema Maternal Grandmother     Melanoma Neg Hx     Psoriasis Neg Hx     Lupus Neg Hx        Social History:  Social History     Social History Main Topics    Smoking status: Never Smoker    Smokeless tobacco: Never Used    Alcohol use Yes      Comment: rarely    Drug use: No    Sexual activity: No       ROS   Review of Systems   Constitutional: Negative for chills and fever.   HENT: Negative for sore throat.    Respiratory: Negative for shortness of breath.    Cardiovascular: Negative for chest pain.   Gastrointestinal: Positive for abdominal pain and nausea. Negative for blood in stool, constipation, diarrhea and vomiting.   Genitourinary: Negative for dysuria, frequency and hematuria.   Musculoskeletal: Negative for back pain.   Skin: Negative for rash.   Neurological: Negative for weakness.   Hematological: Does not bruise/bleed easily.   All other systems reviewed and are negative.    Physical Exam    Initial Vitals   BP Pulse Resp Temp SpO2   05/06/17 0255 05/06/17 0253 05/06/17 0253 05/06/17 0255 05/06/17 0253   190/89 91 20 97.5 °F (36.4 °C) 98 %      Physical Exam  Nursing Notes and Vital Signs Reviewed.  Constitutional: Patient is in no acute distress. Awake and alert. Well-developed and well-nourished.  Head: Atraumatic.  "Normocephalic.  Eyes: PERRL. EOM intact. Conjunctivae are not pale. No scleral icterus.  ENT: Mucous membranes are moist. Oropharynx is clear and symmetric.    Neck: Supple. Full ROM. No lymphadenopathy.  Cardiovascular: Regular rate. Regular rhythm. No murmurs, rubs, or gallops. Distal pulses are 2+ and symmetric.  Pulmonary/Chest: No respiratory distress. Clear to auscultation bilaterally. No wheezing, rales, or rhonchi.  Abdominal:  Soft and non-distended.  There is RLQ tenderness to palpation.  No rebound, guarding, or rigidity.  No organomegaly. No pulsatile mass. No hernias or masses palpated.  Musculoskeletal: Moves all extremities. No obvious deformities. No edema.   Skin: Warm and dry.  Neurological:  Alert, awake, and appropriate.  Normal speech.  No acute focal neurological deficits are appreciated.  Psychiatric: Normal affect. Good eye contact. Appropriate in content.    ED Course    Procedures  ED Vital Signs:  Vitals:    05/06/17 0253 05/06/17 0255 05/06/17 0600   BP:  (!) 190/89 (!) 159/100   Pulse: 91  78   Resp: 20  20   Temp:  97.5 °F (36.4 °C) 98 °F (36.7 °C)   TempSrc: Oral  Oral   SpO2: 98%  96%   Weight: (!) 138.3 kg (305 lb)     Height: 5' 11" (1.803 m)         Abnormal Lab Results:  Labs Reviewed   CBC W/ AUTO DIFFERENTIAL - Abnormal; Notable for the following:        Result Value    RBC 4.53 (*)     MCH 31.8 (*)     All other components within normal limits   COMPREHENSIVE METABOLIC PANEL - Abnormal; Notable for the following:     CO2 22 (*)     Alkaline Phosphatase 20 (*)     AST 44 (*)     ALT 78 (*)     All other components within normal limits   URINALYSIS - Abnormal; Notable for the following:     Occult Blood UA Trace (*)     All other components within normal limits   LIPASE        All Lab Results:  Results for orders placed or performed during the hospital encounter of 05/06/17   CBC W/ AUTO DIFFERENTIAL   Result Value Ref Range    WBC 8.49 3.90 - 12.70 K/uL    RBC 4.53 (L) 4.60 - " 6.20 M/uL    Hemoglobin 14.4 14.0 - 18.0 g/dL    Hematocrit 40.1 40.0 - 54.0 %    MCV 89 82 - 98 fL    MCH 31.8 (H) 27.0 - 31.0 pg    MCHC 35.9 32.0 - 36.0 %    RDW 12.2 11.5 - 14.5 %    Platelets SEE COMMENT 150 - 350 K/uL    MPV SEE COMMENT 9.2 - 12.9 fL    Gran # 5.4 1.8 - 7.7 K/uL    Lymph # 2.4 1.0 - 4.8 K/uL    Mono # 0.5 0.3 - 1.0 K/uL    Eos # 0.1 0.0 - 0.5 K/uL    Baso # 0.01 0.00 - 0.20 K/uL    Gran% 64.0 38.0 - 73.0 %    Lymph% 28.4 18.0 - 48.0 %    Mono% 5.9 4.0 - 15.0 %    Eosinophil% 1.6 0.0 - 8.0 %    Basophil% 0.1 0.0 - 1.9 %    Differential Method Automated    Comp. Metabolic Panel   Result Value Ref Range    Sodium 137 136 - 145 mmol/L    Potassium 4.3 3.5 - 5.1 mmol/L    Chloride 103 95 - 110 mmol/L    CO2 22 (L) 23 - 29 mmol/L    Glucose 100 70 - 110 mg/dL    BUN, Bld 17 6 - 20 mg/dL    Creatinine 0.8 0.5 - 1.4 mg/dL    Calcium 9.2 8.7 - 10.5 mg/dL    Total Protein 7.5 6.0 - 8.4 g/dL    Albumin 4.0 3.5 - 5.2 g/dL    Total Bilirubin 0.5 0.1 - 1.0 mg/dL    Alkaline Phosphatase 20 (L) 55 - 135 U/L    AST 44 (H) 10 - 40 U/L    ALT 78 (H) 10 - 44 U/L    Anion Gap 12 8 - 16 mmol/L    eGFR if African American >60 >60 mL/min/1.73 m^2    eGFR if non African American >60 >60 mL/min/1.73 m^2   Lipase   Result Value Ref Range    Lipase 18 4 - 60 U/L   Urinalysis - Clean Catch   Result Value Ref Range    Specimen UA Urine, Clean Catch     Color, UA Yellow Yellow, Straw, Radhika    Appearance, UA Clear Clear    pH, UA 6.0 5.0 - 8.0    Specific Gravity, UA 1.015 1.005 - 1.030    Protein, UA Negative Negative    Glucose, UA Negative Negative    Ketones, UA Negative Negative    Bilirubin (UA) Negative Negative    Occult Blood UA Trace (A) Negative    Nitrite, UA Negative Negative    Urobilinogen, UA Negative <2.0 EU/dL    Leukocytes, UA Negative Negative         Imaging Results:  Imaging Results         CT Abdomen Pelvis With Contrast (Final result) Result time:  05/06/17 08:40:24    Final result by Boyd Siddiqui  III, MD (05/06/17 08:40:24)    Impression:      1. Stable minimal bladder wall thickening possibly due to under distention or mild cystitis.  No acute inflammatory process identified in the right lower quadrant.  2.  Enlarged, fatty liver.  3.  Mildly complex 2.5 cm cystic lesion in the superior pole the right kidney.  4.  Stable small mesenteric lymph nodes, likely clinically insignificant.        Electronically signed by: MARSHALL MEDEROS MD  Date:     05/06/17  Time:    08:40     Narrative:    CT ABDOMEN PELVIS WITH CONTRAST    Clinical history: Right lower quadrant abdominal pain, s/p appendectomy several years ago    TECHNIQUE: Routine abdominal and pelvic CT performed after the IV administration of 75 mL Omnipaque 350. Coronal and sagittal images obtained. All CT scans at this facility use dose modulation, iterative reconstruction, and/or weight based dosing when appropriate to reduce radiation dose to as low as reasonably achievable.    Comparison: Prior abdominal CT 06/21/2011    Findings: No acute disease is seen in the lung bases.  No acute osseous abnormality or suspicious bone marrow lesion identified.     The appendix is surgically absent.  No inflammatory changes are seen in the right lower quadrant.  There is no evidence of bowel obstruction, acute inflammatory process or other acute bowel pathology. The stomach is within normal limits. No free intraperitoneal fluid, free air or abscess identified.    There is stable hepatomegaly and diffuse fatty infiltration of the liver. The gallbladder and bile ducts are unremarkable. The pancreas, spleen and adrenals are unremarkable. No aortic aneurysm is identified.  There is stable mild fullness of the bilateral renal pelvis without evidence of urolithiasis or obstructive uropathy.  There is a stable 1.9 cm exophytic cystic lesion in the superior pole of the right kidney.  There is a new adjacent 2.5 cm mildly complex cystic lesion in the superior pole cortex of  the right kidney. The kidneys and ureters are otherwise unremarkable.  There is mild circumferential bladder wall thickening similar appearance to prior CT.  Several small nonenlarged mesenteric lymph nodes are stable in likely incidental.  No pathologically enlarged lymph nodes are identified.             Per [facility], pt's CT impression: Likely apparent wall thickening of partially/nondistended urinary bladder, unlikely to represent cystitis/UTI. Recommend correlation with urinary analysis. Hepatosplenomegaly, diffuse severe fatty infiltration of the liver without cholelithiasis or acute cholecystitis. Recommend correlation with liver function test. Other nonemergent findings as described.           The Emergency Provider reviewed the vital signs and test results, which are outlined above.    ED Discussion     6:02 AM: Re-evaluated pt. Pt is resting comfortably and is in no acute distress.  D/w pt all pertinent results. D/w pt any concerns expressed at this time. Answered all questions. Pt expresses understanding at this time.    6:28 AM: Reassessed pt at this time.  Pt is resting comfortably in ED bed, in NAD, and VSS at this time. Discussed with pt all pertinent ED information and results. Discussed pt dx and plan of tx.  Advised f/u c PCP/GI to reevaluate diverticulosis and Urology for reeval of cystitis. Gave pt all f/u and return to the ED instructions. All questions and concerns were addressed at this time. Pt expresses understanding of information and instructions, and is comfortable with plan to discharge. Pt is stable for discharge.    Regarding ABDOMINAL PAIN, I recommended that the patient: Sip water or other clear fluids; avoid solid food for the first few hours after vomiting or diarrhea; if vomiting, wait 6 hours, and then eat small amounts of mild foods such as rice, applesauce, or crackers; avoid dairy products; avoid citrus, high-fat foods, fried or greasy foods, tomato products, caffeine, alcohol,  and carbonated beverages;  avoid aspirin, ibuprofen or other anti-inflammatory medications, and narcotic pain medications unless prescribed.  In regards to prevention, I encouraged patient to:  Avoid fatty or greasy foods; drink plenty of water each day; eat small meals more frequently; exercise regularly; limit foods that produce gas; make sure meals are well-balanced and high in fiber and include plenty of fruits and vegetables.    For interstitial cystitis, I instructed patient to avoid holding in urine and recommended that she urinate when she feels the urge.  Also advised patient to drink plenty of liquids and reminded patient that she may need to drink more fluids than usual to help flush out the bacteria. Instructed patient to avoid alcohol, caffeine, and citrus juices as these substances can irritate your bladder and increase your symptoms.  Also recommended that patient apply heat to lower abdomen for 20 to 30 minutes every two hours to help decrease discomfort and pressure in the bladder.    I discussed with patient and/or family/caretaker that evaluation in the ED does not suggest any emergent or life threatening medical conditions requiring immediate intervention beyond what was provided in the ED, and I believe patient is safe for discharge.  Regardless, an unremarkable evaluation in the ED does not preclude the development or presence of a serious of life threatening condition. As such, patient was instructed to return immediately for any worsening or change in current symptoms.    Driving or other activities under influence of medications - Patient and/or family/caretaker was given a prescription for, or instructed to use a medicine that may impair ability to drive, operate machinery, or participate in other potentially dangerous activities.  Patient was instructed not to participate in these activities while under the influence of these medications.      ED Medication(s):  Medications   morphine  injection 4 mg (4 mg Intravenous Given 5/6/17 0402)   ondansetron injection 4 mg (4 mg Intravenous Given 5/6/17 0402)   sodium chloride 0.9% bolus 1,000 mL (0 mLs Intravenous Stopped 5/6/17 0625)   omnipaque 350 iohexol 75 mL (75 mLs Intravenous Given 5/6/17 0504)       Discharge Medication List as of 5/6/2017  6:13 AM      START taking these medications    Details   ciprofloxacin HCl (CIPRO) 500 MG tablet Take 1 tablet (500 mg total) by mouth 2 (two) times daily., Starting 5/6/2017, Until Tue 5/16/17, Print      naproxen (NAPROSYN) 500 MG tablet Take 1 tablet (500 mg total) by mouth 2 (two) times daily with meals., Starting 5/6/2017, Until Discontinued, Print             Follow-up Information     Follow up with Vin Thomson MD. Schedule an appointment as soon as possible for a visit in 1 week.    Specialty:  Family Medicine    Contact information:    [address] [facility] [address] [phone]          Follow up with [facility]. Schedule an appointment as soon as possible for a visit in 1 week.    Specialty:  Urology    Contact information:    [address] [facility] [address] [phone]    Additional information:    (off [facility]) 4th floor        Follow up with [facility] - .    Specialty:  Emergency Medicine    Why:  As needed, If symptoms worsen    Contact information:    [address] [facility] [address] [phone]            Medical Decision Making    Medical Decision Making:   Clinical Tests:   Lab Tests: Ordered and Reviewed  Radiological Study: Reviewed and Ordered           Scribe Attestation:   Scribe #1: I performed the above scribed service and the documentation accurately describes the services I performed. I attest to the accuracy of the note.    Attending:   Physician Attestation Statement for Scribe #1: I, Yuriy Smith Jr., MD, personally performed the services described in this documentation, as scribed by  Efrem Rock, in my presence, and it is both accurate and complete.          Clinical Impression       ICD-10-CM ICD-9-CM   1. Abdominal pain, unspecified location R10.9 789.00   2. Elevated LFTs R94.5 790.6   3. Interstitial cystitis N30.10 595.1   4. Diverticulosis of intestine without bleeding, unspecified intestinal tract location K57.90 562.10       Disposition:   Disposition: Discharged  Condition: Stable         Yuriy Smith Jr., MD  05/09/17 6285

## 2017-05-06 NOTE — DISCHARGE INSTRUCTIONS
Diverticulosis    Diverticulosis means that small pouches have formed in the wall of your large intestine (colon). Most often, this problem causes no symptoms and is common as people age. But the pouches in the colon are at risk of becoming infected. When this happens, the condition is called diverticulitis. Although most people with diverticulosis never develop diverticulitis, it is still not uncommon. Rectal bleeding can also occur and in less common situations, a type of colon inflammation called colitis.  While most people do not have symptoms, some people with diverticulosis may have:  · Abdominal cramps and pain  · Bloating  · Constipation  · Change in bowel habits  Causes  The exact cause of diverticulosis (and diverticulitis) has not been proved, but a few things are associated with the condition:  · Low-fiber diet  · Constipation  · Lack of exercise  Your healthcare provider will talk with you about how to manage your condition. Diet changes may be all that are needed to help control diverticulosis and prevent progression to diverticulitis. If you develop diverticulitis, you will likely need other treatments.  Home care  You may be told to take fiber supplements daily. Fiber adds bulk to the stool so that it passes through the colon more easily. Stool softeners may be recommended. You may also be given medications for pain relief. Be sure to take all medications as directed.  In the past, people were told to avoid corn, nuts, and seeds. This is no longer necessary.  Follow these guidelines when caring for yourself at home:  · Eat unprocessed foods that are high in fiber. Whole grains, fruits, and vegetables are good choices.  · Drink 6 to 8 glasses of water every day unless your healthcare provider has you limit how much fluid you should have.  · Watch for changes in your bowel movements. Tell your provider if you notice any changes.  · Begin an exercise program. Ask your provider how to get started.  Generally, walking is the best.  · Get plenty of rest and sleep.  Follow-up care  Follow up with your healthcare provider, or as advised. Regular visits may be needed to check on your health. Sometimes special procedures such as colonoscopy, are needed after an episode of diverticulitis or blooding. Be sure to keep all your appointments.  If a stool sample was taken, or cultures were done, you should be told if they are positive, or if your treatment needs to be changed. You can call as directed for the results.  If X-rays were done, a radiologist will look at them. You will be told if there is a change in your treatment.  If antibiotics were prescribed, be sure to finish them all.  When to seek medical advice  Call your healthcare provider right away if any of these occur:  · Fever of 100.4°F (38°C) or higher, or as directed by your healthcare provider  · Severe cramps in the lower left side of the abdomen or pain that is getting worse  · Tenderness in the lower left side of the abdomen or worsening pain throughout the abdomen  · Diarrhea or constipation that doesn't get better within 24 hours  · Nausea and vomiting  · Bleeding from the rectum  Call 911  Call emergency services if any of the following occur:  · Trouble breathing  · Confusion  · Very drowsy or trouble awakening  · Fainting or loss of consciousness  · Rapid heart rate  · Chest pain  Date Last Reviewed: 12/30/2015 © 2000-2016 [employer]. [address] [place] [address]. All rights reserved. This information is not intended as a substitute for professional medical care. Always follow your healthcare professional's instructions.          Anatomy of the Male Urinary Tract  Your urinary tract helps to get rid of your bodys liquid waste. The kidneys constantly filter the blood to collect unneeded chemicals and water, making urine. Urine travels through the ureters to the bladder. The bladder fills with urine, holding it until  youre ready to release it. Signals from the brain tell the sphincter (muscles around the opening of the bladder) when to let urine flow out of the bladder. The urethra is the tube that carries urine from the bladder out of the body. In men, the prostate gland wraps around the urethra near the bladder.     Front view of male urinary tract.     Date Last Reviewed: 8/27/2014  © 6140-1042 [employer]. [address] [place] [address], [place] [address]. All rights reserved. This information is not intended as a substitute for professional medical care. Always follow your healthcare professional's instructions.

## 2017-05-30 RX ORDER — METFORMIN HYDROCHLORIDE 1000 MG/1
TABLET ORAL
Qty: 30 TABLET | Refills: 0 | OUTPATIENT
Start: 2017-05-30

## 2017-06-23 ENCOUNTER — LAB VISIT (OUTPATIENT)
Dept: LAB | Facility: HOSPITAL | Age: 22
End: 2017-06-23
Attending: FAMILY MEDICINE
Payer: COMMERCIAL

## 2017-06-23 ENCOUNTER — TELEPHONE (OUTPATIENT)
Dept: INTERNAL MEDICINE | Facility: CLINIC | Age: 22
End: 2017-06-23

## 2017-06-23 DIAGNOSIS — E11.9 TYPE 2 DIABETES MELLITUS WITHOUT COMPLICATION, WITHOUT LONG-TERM CURRENT USE OF INSULIN: ICD-10-CM

## 2017-06-23 DIAGNOSIS — E11.9 TYPE 2 DIABETES MELLITUS WITHOUT COMPLICATION, WITHOUT LONG-TERM CURRENT USE OF INSULIN: Primary | ICD-10-CM

## 2017-06-23 LAB
ALBUMIN SERPL BCP-MCNC: 3.9 G/DL
ALP SERPL-CCNC: 23 U/L
ALT SERPL W/O P-5'-P-CCNC: 89 U/L
ANION GAP SERPL CALC-SCNC: 10 MMOL/L
AST SERPL-CCNC: 48 U/L
BILIRUB SERPL-MCNC: 0.7 MG/DL
BUN SERPL-MCNC: 17 MG/DL
CALCIUM SERPL-MCNC: 9.8 MG/DL
CHLORIDE SERPL-SCNC: 104 MMOL/L
CHOLEST/HDLC SERPL: 5.2 {RATIO}
CO2 SERPL-SCNC: 26 MMOL/L
CREAT SERPL-MCNC: 0.8 MG/DL
EST. GFR  (AFRICAN AMERICAN): >60 ML/MIN/1.73 M^2
EST. GFR  (NON AFRICAN AMERICAN): >60 ML/MIN/1.73 M^2
ESTIMATED AVG GLUCOSE: 100 MG/DL
GLUCOSE SERPL-MCNC: 99 MG/DL
HBA1C MFR BLD HPLC: 5.1 %
HDL/CHOLESTEROL RATIO: 19.3 %
HDLC SERPL-MCNC: 161 MG/DL
HDLC SERPL-MCNC: 31 MG/DL
LDLC SERPL CALC-MCNC: 98 MG/DL
NONHDLC SERPL-MCNC: 130 MG/DL
POTASSIUM SERPL-SCNC: 4.1 MMOL/L
PROT SERPL-MCNC: 7.5 G/DL
SODIUM SERPL-SCNC: 140 MMOL/L
TRIGL SERPL-MCNC: 160 MG/DL

## 2017-06-23 PROCEDURE — 80053 COMPREHEN METABOLIC PANEL: CPT

## 2017-06-23 PROCEDURE — 83036 HEMOGLOBIN GLYCOSYLATED A1C: CPT

## 2017-06-23 PROCEDURE — 80061 LIPID PANEL: CPT

## 2017-06-23 PROCEDURE — 36415 COLL VENOUS BLD VENIPUNCTURE: CPT | Mod: PO

## 2017-06-23 RX ORDER — METFORMIN HYDROCHLORIDE 1000 MG/1
TABLET ORAL
Qty: 60 TABLET | Refills: 5 | Status: SHIPPED | OUTPATIENT
Start: 2017-06-23 | End: 2017-08-09 | Stop reason: SDUPTHER

## 2017-07-05 ENCOUNTER — TELEPHONE (OUTPATIENT)
Dept: INTERNAL MEDICINE | Facility: CLINIC | Age: 22
End: 2017-07-05

## 2017-07-05 DIAGNOSIS — F32.A DEPRESSION, UNSPECIFIED DEPRESSION TYPE: Primary | ICD-10-CM

## 2017-07-05 DIAGNOSIS — F41.9 ANXIETY: ICD-10-CM

## 2017-07-05 NOTE — TELEPHONE ENCOUNTER
----- Message from [address] sent at 2017 10:28 AM CDT -----  Contact: Margot- mom  Dr. Thomson told her he would give a referral to a psychologist.  Would like referral to be sent so she can have Wilver and also other son, Jose A Saba ( 91) start seeing them.  Please call her at [phone].

## 2017-07-20 ENCOUNTER — LAB VISIT (OUTPATIENT)
Dept: LAB | Facility: HOSPITAL | Age: 22
End: 2017-07-20
Attending: ORTHOPAEDIC SURGERY
Payer: COMMERCIAL

## 2017-07-20 DIAGNOSIS — M50.10 CERVICAL DISC SYNDROME: ICD-10-CM

## 2017-07-20 DIAGNOSIS — M50.10 CERVICAL DISC SYNDROME: Primary | ICD-10-CM

## 2017-07-20 LAB
ANION GAP SERPL CALC-SCNC: 12 MMOL/L
BASOPHILS # BLD AUTO: 0.02 K/UL
BASOPHILS NFR BLD: 0.2 %
BUN SERPL-MCNC: 14 MG/DL
CALCIUM SERPL-MCNC: 10.1 MG/DL
CHLORIDE SERPL-SCNC: 104 MMOL/L
CO2 SERPL-SCNC: 24 MMOL/L
CREAT SERPL-MCNC: 0.9 MG/DL
CRP SERPL-MCNC: 22.7 MG/L
DIFFERENTIAL METHOD: ABNORMAL
EOSINOPHIL # BLD AUTO: 0.2 K/UL
EOSINOPHIL NFR BLD: 1.8 %
ERYTHROCYTE [DISTWIDTH] IN BLOOD BY AUTOMATED COUNT: 11.9 %
ERYTHROCYTE [SEDIMENTATION RATE] IN BLOOD BY WESTERGREN METHOD: 5 MM/HR
EST. GFR  (AFRICAN AMERICAN): >60 ML/MIN/1.73 M^2
EST. GFR  (NON AFRICAN AMERICAN): >60 ML/MIN/1.73 M^2
GLUCOSE SERPL-MCNC: 90 MG/DL
HCT VFR BLD AUTO: 43.1 %
HGB BLD-MCNC: 15.2 G/DL
LYMPHOCYTES # BLD AUTO: 2.3 K/UL
LYMPHOCYTES NFR BLD: 22.1 %
MCH RBC QN AUTO: 31.3 PG
MCHC RBC AUTO-ENTMCNC: 35.3 G/DL
MCV RBC AUTO: 89 FL
MONOCYTES # BLD AUTO: 0.7 K/UL
MONOCYTES NFR BLD: 6.7 %
NEUTROPHILS # BLD AUTO: 7.3 K/UL
NEUTROPHILS NFR BLD: 68.8 %
PLATELET # BLD AUTO: 295 K/UL
PMV BLD AUTO: 10.7 FL
POTASSIUM SERPL-SCNC: 3.8 MMOL/L
RBC # BLD AUTO: 4.85 M/UL
RHEUMATOID FACT SERPL-ACNC: <10 IU/ML
SODIUM SERPL-SCNC: 140 MMOL/L
URATE SERPL-MCNC: 8 MG/DL
WBC # BLD AUTO: 10.6 K/UL

## 2017-07-20 PROCEDURE — 85651 RBC SED RATE NONAUTOMATED: CPT | Mod: PO

## 2017-07-20 PROCEDURE — 81374 HLA I TYPING 1 ANTIGEN LR: CPT

## 2017-07-20 PROCEDURE — 36415 COLL VENOUS BLD VENIPUNCTURE: CPT | Mod: PO

## 2017-07-20 PROCEDURE — 85025 COMPLETE CBC W/AUTO DIFF WBC: CPT

## 2017-07-20 PROCEDURE — 86140 C-REACTIVE PROTEIN: CPT

## 2017-07-20 PROCEDURE — 80048 BASIC METABOLIC PNL TOTAL CA: CPT

## 2017-07-20 PROCEDURE — 84550 ASSAY OF BLOOD/URIC ACID: CPT

## 2017-07-20 PROCEDURE — 86038 ANTINUCLEAR ANTIBODIES: CPT

## 2017-07-20 PROCEDURE — 86431 RHEUMATOID FACTOR QUANT: CPT

## 2017-07-21 LAB — ANA SER QL IF: NORMAL

## 2017-07-22 DIAGNOSIS — I10 ESSENTIAL HYPERTENSION: ICD-10-CM

## 2017-07-22 RX ORDER — LOSARTAN POTASSIUM AND HYDROCHLOROTHIAZIDE 25; 100 MG/1; MG/1
1 TABLET ORAL DAILY
Qty: 90 TABLET | Refills: 0 | Status: CANCELLED | OUTPATIENT
Start: 2017-07-22

## 2017-07-22 RX ORDER — AMLODIPINE BESYLATE 10 MG/1
TABLET ORAL
Qty: 90 TABLET | Refills: 0 | OUTPATIENT
Start: 2017-07-22

## 2017-08-01 LAB
HLA-B27 RELATED AG QL: NORMAL
HLA-B27 RELATED AG QL: POSITIVE

## 2017-08-09 ENCOUNTER — OFFICE VISIT (OUTPATIENT)
Dept: INTERNAL MEDICINE | Facility: CLINIC | Age: 22
End: 2017-08-09
Payer: COMMERCIAL

## 2017-08-09 VITALS
SYSTOLIC BLOOD PRESSURE: 133 MMHG | HEART RATE: 90 BPM | DIASTOLIC BLOOD PRESSURE: 96 MMHG | WEIGHT: 312.38 LBS | HEIGHT: 71 IN | OXYGEN SATURATION: 97 % | BODY MASS INDEX: 43.73 KG/M2 | TEMPERATURE: 97 F

## 2017-08-09 DIAGNOSIS — J45.909 UNCOMPLICATED ASTHMA, UNSPECIFIED ASTHMA SEVERITY: Chronic | ICD-10-CM

## 2017-08-09 DIAGNOSIS — I10 ESSENTIAL HYPERTENSION: Chronic | ICD-10-CM

## 2017-08-09 DIAGNOSIS — E11.21 TYPE 2 DIABETES WITH NEPHROPATHY: Primary | Chronic | ICD-10-CM

## 2017-08-09 DIAGNOSIS — R79.89 ELEVATED LFTS: ICD-10-CM

## 2017-08-09 DIAGNOSIS — Z15.89 HLA B27 (HLA B27 POSITIVE): ICD-10-CM

## 2017-08-09 DIAGNOSIS — N28.1 RENAL CYST: ICD-10-CM

## 2017-08-09 DIAGNOSIS — E66.01 MORBID OBESITY, UNSPECIFIED OBESITY TYPE: ICD-10-CM

## 2017-08-09 PROCEDURE — 99214 OFFICE O/P EST MOD 30 MIN: CPT | Mod: S$GLB,,, | Performed by: FAMILY MEDICINE

## 2017-08-09 PROCEDURE — 3044F HG A1C LEVEL LT 7.0%: CPT | Mod: S$GLB,,, | Performed by: FAMILY MEDICINE

## 2017-08-09 PROCEDURE — 4010F ACE/ARB THERAPY RXD/TAKEN: CPT | Mod: S$GLB,,, | Performed by: FAMILY MEDICINE

## 2017-08-09 PROCEDURE — 3008F BODY MASS INDEX DOCD: CPT | Mod: S$GLB,,, | Performed by: FAMILY MEDICINE

## 2017-08-09 PROCEDURE — 99999 PR PBB SHADOW E&M-EST. PATIENT-LVL III: CPT | Mod: PBBFAC,,, | Performed by: FAMILY MEDICINE

## 2017-08-09 PROCEDURE — 90471 IMMUNIZATION ADMIN: CPT | Mod: S$GLB,,, | Performed by: FAMILY MEDICINE

## 2017-08-09 PROCEDURE — 90715 TDAP VACCINE 7 YRS/> IM: CPT | Mod: S$GLB,,, | Performed by: FAMILY MEDICINE

## 2017-08-09 PROCEDURE — 3080F DIAST BP >= 90 MM HG: CPT | Mod: S$GLB,,, | Performed by: FAMILY MEDICINE

## 2017-08-09 PROCEDURE — 3075F SYST BP GE 130 - 139MM HG: CPT | Mod: S$GLB,,, | Performed by: FAMILY MEDICINE

## 2017-08-09 RX ORDER — METFORMIN HYDROCHLORIDE 1000 MG/1
TABLET ORAL
Qty: 180 TABLET | Refills: 11 | Status: SHIPPED | OUTPATIENT
Start: 2017-08-09

## 2017-08-09 RX ORDER — ZOLPIDEM TARTRATE 12.5 MG/1
12.5 TABLET, FILM COATED, EXTENDED RELEASE ORAL NIGHTLY PRN
COMMUNITY
End: 2017-09-01 | Stop reason: ALTCHOICE

## 2017-08-09 RX ORDER — BUSPIRONE HYDROCHLORIDE 5 MG/1
5 TABLET ORAL 2 TIMES DAILY
Qty: 60 TABLET | Refills: 2 | Status: SHIPPED | OUTPATIENT
Start: 2017-08-09

## 2017-08-09 RX ORDER — DESVENLAFAXINE SUCCINATE 50 MG/1
50 TABLET, EXTENDED RELEASE ORAL DAILY
Qty: 30 TABLET | Refills: 2 | Status: SHIPPED | OUTPATIENT
Start: 2017-08-09 | End: 2017-09-01 | Stop reason: DRUGHIGH

## 2017-08-09 RX ORDER — LOSARTAN POTASSIUM AND HYDROCHLOROTHIAZIDE 25; 100 MG/1; MG/1
1 TABLET ORAL DAILY
Qty: 90 TABLET | Refills: 3 | Status: SHIPPED | OUTPATIENT
Start: 2017-08-09 | End: 2018-08-09

## 2017-08-09 NOTE — PROGRESS NOTES
Subjective:       Patient ID: Wilver Saba is a 22 y.o. male.    Chief Complaint: Multiple issues see below    HPI dr braswell chr neck  pain and +hla 27(  Type 2 diabetes with nephropathy  Stable controlled   Uncomplicated asthma, unspecified asthma severity    Elevated LFTs d/wd gi. Fatty liver seen on ct    Essential hypertension typ nl   Morbid obesity, unspecified obesity type Morbid obesity : we have inpast discussed need for  weight loss via health diet/portion control and if able then  exercise       Past Medical History:   Diagnosis Date    ADHD (attention deficit hyperactivity disorder)     Anxiety     Asthma     Depression     dr torres    Diabetes mellitus 2010     08/20/2014    DM (diabetes mellitus) 2010    BS  115 x 1 month ago 08/25/2015    Headache(784.0)     HLA B27 (HLA B27 positive)     Hypertension     Migraine headache     Morbid obesity     TAO (obstructive sleep apnea)     Type 2 diabetes with nephropathy      Past Surgical History:   Procedure Laterality Date    APPENDECTOMY      FRACTURE SURGERY      SHOULDER ARTHROSCOPY W/ LABRAL REPAIR      TONSILLECTOMY       Family History   Problem Relation Age of Onset    Hypertension Mother     Diabetes Mother     Glaucoma Mother     Strabismus Mother     Hypertension Father     Diabetes Father     Diabetes Brother     Strabismus Brother     Eczema Maternal Uncle     Eczema Maternal Grandmother     Melanoma Neg Hx     Psoriasis Neg Hx     Lupus Neg Hx      Social History     Social History    Marital status: Single     Spouse name: N/A    Number of children: 0    Years of education: N/A     Occupational History     [employer]     Social History Main Topics    Smoking status: Never Smoker    Smokeless tobacco: Never Used    Alcohol use Yes      Comment: rarely    Drug use: No    Sexual activity: No     Other Topics Concern    None     Social History Narrative    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 He appears well-developed and well-nourished.   HENT:   Head: Normocephalic and atraumatic.   Neck: Normal range of motion. Neck supple. Carotid bruit is not present.   Cardiovascular: Normal rate and regular rhythm.    Pulmonary/Chest: Effort normal and breath sounds normal.   Abdominal: Soft. Bowel sounds are normal. He exhibits no distension and no mass. There is no tenderness. There is no rebound and no guarding.   Lymphadenopathy:     He has no cervical adenopathy.   Neurological: He is alert and oriented to person, place, and time.   Skin: Skin is warm and dry.   Psychiatric: He has a normal mood and affect. His behavior is normal. Judgment normal.   Nursing note and vitals reviewed.    Bilateral feet with normal monofilament testing and no lesions.    Assessment:       1. Type 2 diabetes with nephropathy    2. Uncomplicated asthma, unspecified asthma severity    3. Elevated LFTs    4. Essential hypertension    5. Morbid obesity, unspecified obesity type      fatty liver  Plan:       *Type 2 diabetes with nephropathy  -     Hemoglobin A1c; Future; Expected date: 02/05/2018    Uncomplicated asthma, unspecified asthma severity    Elevated LFTs  -     AST (SGOT); Future; Expected date: 02/05/2018  -     ALT (SGPT); Future; Expected date: 02/05/2018  -     Ambulatory referral to Gastroenterology: brother fatty liver/cirrhosis    Essential hypertension    Morbid obesity, unspecified obesity type    HLA B27 (HLA B27 positive)    Renal cyst  -     US Kidney Only; Future; Expected date: 08/09/2017    Other orders  -     losartan-hydrochlorothiazide 100-25 mg (HYZAAR) 100-25 mg per tablet; Take 1 tablet by mouth once daily.  Dispense: 90 tablet; Refill: 3  -     metformin (GLUCOPHAGE) 1000 MG tablet; 1 Tablet Oral Twice a day .  Dispense: 180 tablet; Refill: 11  -     busPIRone (BUSPAR) 5 MG  Tab; Take 1 tablet (5 mg total) by mouth 2 (two) times daily.  Dispense: 60 tablet; Refill: 2  -     desvenlafaxine succinate (PRISTIQ) 50 MG Tb24; Take 1 tablet (50 mg total) by mouth once daily.  Dispense: 30 tablet; Refill: 2  -     (In Office Administered) Tdap Vaccine    **  psych as sched  Resume buspar, pristiq; dont resume ambien has been off several weeks    Has fu dr braswell chr neck  pain and +hla 27(determine if rhem appt needed per french holm)    Nurse bp check 2 weeks    F/u 6m

## 2017-08-24 ENCOUNTER — LAB VISIT (OUTPATIENT)
Dept: LAB | Facility: HOSPITAL | Age: 22
End: 2017-08-24
Attending: NURSE PRACTITIONER
Payer: COMMERCIAL

## 2017-08-24 ENCOUNTER — TELEPHONE (OUTPATIENT)
Dept: GASTROENTEROLOGY | Facility: CLINIC | Age: 22
End: 2017-08-24

## 2017-08-24 ENCOUNTER — INITIAL CONSULT (OUTPATIENT)
Dept: GASTROENTEROLOGY | Facility: CLINIC | Age: 22
End: 2017-08-24
Payer: COMMERCIAL

## 2017-08-24 VITALS
DIASTOLIC BLOOD PRESSURE: 90 MMHG | HEIGHT: 71 IN | SYSTOLIC BLOOD PRESSURE: 150 MMHG | BODY MASS INDEX: 43.12 KG/M2 | HEART RATE: 58 BPM | WEIGHT: 308 LBS

## 2017-08-24 DIAGNOSIS — K76.0 FATTY LIVER DISEASE, NONALCOHOLIC: ICD-10-CM

## 2017-08-24 DIAGNOSIS — R74.8 ELEVATED LIVER ENZYMES: Primary | ICD-10-CM

## 2017-08-24 DIAGNOSIS — R74.8 ELEVATED LIVER ENZYMES: ICD-10-CM

## 2017-08-24 LAB
ALBUMIN SERPL BCP-MCNC: 4 G/DL
ALP SERPL-CCNC: 22 U/L
ALT SERPL W/O P-5'-P-CCNC: 80 U/L
ANION GAP SERPL CALC-SCNC: 10 MMOL/L
AST SERPL-CCNC: 49 U/L
BASOPHILS # BLD AUTO: 0.02 K/UL
BASOPHILS NFR BLD: 0.3 %
BILIRUB SERPL-MCNC: 0.7 MG/DL
BUN SERPL-MCNC: 12 MG/DL
CALCIUM SERPL-MCNC: 9.6 MG/DL
CERULOPLASMIN SERPL-MCNC: 23 MG/DL
CHLORIDE SERPL-SCNC: 105 MMOL/L
CO2 SERPL-SCNC: 25 MMOL/L
CREAT SERPL-MCNC: 0.9 MG/DL
DIFFERENTIAL METHOD: NORMAL
EOSINOPHIL # BLD AUTO: 0.3 K/UL
EOSINOPHIL NFR BLD: 3.9 %
ERYTHROCYTE [DISTWIDTH] IN BLOOD BY AUTOMATED COUNT: 12 %
EST. GFR  (AFRICAN AMERICAN): >60 ML/MIN/1.73 M^2
EST. GFR  (NON AFRICAN AMERICAN): >60 ML/MIN/1.73 M^2
FERRITIN SERPL-MCNC: 470 NG/ML
GLUCOSE SERPL-MCNC: 95 MG/DL
HCT VFR BLD AUTO: 42.4 %
HGB BLD-MCNC: 14.9 G/DL
IRON SERPL-MCNC: 97 UG/DL
LYMPHOCYTES # BLD AUTO: 1.9 K/UL
LYMPHOCYTES NFR BLD: 30.1 %
MCH RBC QN AUTO: 30.9 PG
MCHC RBC AUTO-ENTMCNC: 35.1 G/DL
MCV RBC AUTO: 88 FL
MONOCYTES # BLD AUTO: 0.4 K/UL
MONOCYTES NFR BLD: 6.6 %
NEUTROPHILS # BLD AUTO: 3.7 K/UL
NEUTROPHILS NFR BLD: 58.6 %
PLATELET # BLD AUTO: 273 K/UL
PMV BLD AUTO: 10.9 FL
POTASSIUM SERPL-SCNC: 4.3 MMOL/L
PROT SERPL-MCNC: 7.3 G/DL
RBC # BLD AUTO: 4.82 M/UL
SATURATED IRON: 27 %
SODIUM SERPL-SCNC: 140 MMOL/L
TOTAL IRON BINDING CAPACITY: 364 UG/DL
TRANSFERRIN SERPL-MCNC: 246 MG/DL
WBC # BLD AUTO: 6.35 K/UL

## 2017-08-24 PROCEDURE — 86256 FLUORESCENT ANTIBODY TITER: CPT | Mod: 91

## 2017-08-24 PROCEDURE — 3080F DIAST BP >= 90 MM HG: CPT | Mod: S$GLB,,, | Performed by: NURSE PRACTITIONER

## 2017-08-24 PROCEDURE — 85025 COMPLETE CBC W/AUTO DIFF WBC: CPT

## 2017-08-24 PROCEDURE — 99204 OFFICE O/P NEW MOD 45 MIN: CPT | Mod: S$GLB,,, | Performed by: NURSE PRACTITIONER

## 2017-08-24 PROCEDURE — 86790 VIRUS ANTIBODY NOS: CPT

## 2017-08-24 PROCEDURE — 83540 ASSAY OF IRON: CPT

## 2017-08-24 PROCEDURE — 86235 NUCLEAR ANTIGEN ANTIBODY: CPT

## 2017-08-24 PROCEDURE — 3008F BODY MASS INDEX DOCD: CPT | Mod: S$GLB,,, | Performed by: NURSE PRACTITIONER

## 2017-08-24 PROCEDURE — 82728 ASSAY OF FERRITIN: CPT

## 2017-08-24 PROCEDURE — 82390 ASSAY OF CERULOPLASMIN: CPT

## 2017-08-24 PROCEDURE — 86038 ANTINUCLEAR ANTIBODIES: CPT

## 2017-08-24 PROCEDURE — 99999 PR PBB SHADOW E&M-EST. PATIENT-LVL III: CPT | Mod: PBBFAC,,, | Performed by: NURSE PRACTITIONER

## 2017-08-24 PROCEDURE — 80053 COMPREHEN METABOLIC PANEL: CPT

## 2017-08-24 PROCEDURE — 86803 HEPATITIS C AB TEST: CPT

## 2017-08-24 PROCEDURE — 36415 COLL VENOUS BLD VENIPUNCTURE: CPT | Mod: PO

## 2017-08-24 PROCEDURE — 86706 HEP B SURFACE ANTIBODY: CPT

## 2017-08-24 PROCEDURE — 3077F SYST BP >= 140 MM HG: CPT | Mod: S$GLB,,, | Performed by: NURSE PRACTITIONER

## 2017-08-24 NOTE — PROGRESS NOTES
Clinic Consult:  [facility] Consultation Note    Reason for Consult:  The primary encounter diagnosis was Elevated liver enzymes. A diagnosis of Fatty liver disease, nonalcoholic was also pertinent to this visit.    PCP: Vin Thomson   [id number] / [place] [id number]    HPI:  This is a 22 y.o. male here for evaluation of the above  Pt reports that he was recently seen by his PCP for neck and shoulder issues.  At that time, labs were completed and he was found to have elevated liver enzymes.  Further work up included an US of the abdomen which showed fatty infiltration of the liver and hepatomegaly.   Chart review shows that his LFTs have been mildly elevated since 2006.   PMH includes obesity, DM, hyperlipidemia.   Of note, his younger brother was recently diagnosed with cirrhosis due to GALARZA.     Review of Systems   Constitutional: Negative for chills, fever, malaise/fatigue and weight loss.   Respiratory: Negative for cough.    Cardiovascular: Negative for chest pain.   Gastrointestinal:        Per HPI   Musculoskeletal: Negative for myalgias.   Skin: Negative for itching and rash.   Neurological: Negative for headaches.   Psychiatric/Behavioral: The patient is not nervous/anxious.        Medical History:   Past Medical History:   Diagnosis Date    ADHD (attention deficit hyperactivity disorder)     Anxiety     Asthma     Depression     dr torres    Diabetes mellitus 2010     08/20/2014    DM (diabetes mellitus) 2010    BS  115 x 1 month ago 08/25/2015    Headache(784.0)     HLA B27 (HLA B27 positive)     Hypertension     Migraine headache     Morbid obesity     TAO (obstructive sleep apnea)     Type 2 diabetes with nephropathy        Surgical History:  Past Surgical History:   Procedure Laterality Date    APPENDECTOMY      FRACTURE SURGERY      SHOULDER ARTHROSCOPY W/ LABRAL REPAIR      TONSILLECTOMY         Family History:   Family History   Problem Relation  Age of Onset    Hypertension Mother     Diabetes Mother     Glaucoma Mother     Strabismus Mother     Hypertension Father     Diabetes Father     Diabetes Brother     Strabismus Brother     Eczema Maternal Uncle     Eczema Maternal Grandmother     Melanoma Neg Hx     Psoriasis Neg Hx     Lupus Neg Hx        Social History:   Social History   Substance Use Topics    Smoking status: Never Smoker    Smokeless tobacco: Never Used    Alcohol use Yes      Comment: rarely       Allergies: Reviewed    Home Medications:   Current Outpatient Prescriptions on File Prior to Visit   Medication Sig Dispense Refill    amlodipine (NORVASC) 10 MG tablet Take 1 tablet (10 mg total) by mouth once daily. 1 Tablet Oral Every day 90 tablet 0    busPIRone (BUSPAR) 5 MG Tab Take 1 tablet (5 mg total) by mouth 2 (two) times daily. 60 tablet 2    clindamycin (CLEOCIN T) 1 % lotion AAA of lower legs bid 60 mL 3    desvenlafaxine succinate (PRISTIQ) 50 MG Tb24 Take 1 tablet (50 mg total) by mouth once daily. 30 tablet 2    econazole nitrate 1 % cream AAA bid 85 g 3    hydrocodone-acetaminophen 7.5-325mg (NORCO) 7.5-325 mg per tablet Take 1 tablet by mouth every 6 (six) hours as needed for Pain.      ibuprofen-famotidine 800-26.6 mg Tab Take 1 tablet by mouth 2 (two) times daily as needed.      ketoconazole (NIZORAL) 2 % cream AAA bid 60 g 3    losartan-hydrochlorothiazide 100-25 mg (HYZAAR) 100-25 mg per tablet Take 1 tablet by mouth once daily. 90 tablet 3    metformin (GLUCOPHAGE) 1000 MG tablet 1 Tablet Oral Twice a day . 180 tablet 11    miconazole NITRATE 2 % (ZEASORB AF) 2 % top powder Use two to three times daily to prevent rash 85 g 11    mupirocin (BACTROBAN) 2 % ointment AAA bid for 10 days 30 g 1    mupirocin (BACTROBAN) 2 % ointment AAA bid for 10 days 30 g 1    naproxen (NAPROSYN) 500 MG tablet Take 1 tablet (500 mg total) by mouth 2 (two) times daily with meals. 20 tablet 0    sumatriptan (IMITREX)  "100 MG tablet Take 1 tab at onset of migraine.  May repeat 1 tab in 2 hours.  Limit to 3 tabs/week. 9 tablet 6    topiramate (TOPAMAX) 15 MG capsule Take 1 cap q d for 7d, then 2 caps q d for 7d, then switch to 50 mg tab 21 capsule 0    tramadol (ULTRAM) 50 mg tablet Take 50 mg by mouth every 6 (six) hours as needed for Pain.      triamcinolone acetonide 0.025% (KENALOG) 0.025 % cream AAA bid as needed for flares.  Mild steroid. 80 g 1    triamcinolone acetonide 0.1% (KENALOG) 0.1 % cream Apply topically 2 (two) times daily. Apply to chest eczema      VENTOLIN HFA 90 mcg/actuation inhaler INHALE 2 PUFFS BY MOUTH EVERY 4 HOURS AS NEEDED FOR WHEEZING. 36 Inhaler 0    zolpidem (AMBIEN CR) 12.5 MG CR tablet Take 12.5 mg by mouth nightly as needed for Insomnia.       No current facility-administered medications on file prior to visit.        Physical Exam:  Vital Signs:  BP (!) 150/90   Pulse (!) 58   Ht 5' 11" (1.803 m)   Wt (!) 139.7 kg (307 lb 15.7 oz)   BMI 42.95 kg/m²   Body mass index is 42.95 kg/m².  Physical Exam   Constitutional: He is oriented to person, place, and time. He appears well-developed and well-nourished.   HENT:   Head: Normocephalic.   Eyes: No scleral icterus.   Neck: Normal range of motion.   Cardiovascular: Normal rate and regular rhythm.    Pulmonary/Chest: Effort normal and breath sounds normal.   Abdominal: Soft. Bowel sounds are normal. He exhibits no distension. There is no tenderness.   Musculoskeletal: Normal range of motion.   Neurological: He is alert and oriented to person, place, and time.   Skin: Skin is warm and dry.   Psychiatric: He has a normal mood and affect.   Vitals reviewed.      Labs: Pertinent labs reviewed.      Assessment:  1. Elevated liver enzymes    2. Fatty liver disease, nonalcoholic         Recommendations:  - elevated LFTs likely related to fatty liver disease.  - Will get additional labs to R/O other causes  - Encouraged weight loss through diet and " exercise.  - May need liver biopsy for staging given his family history.   Elevated liver enzymes  -     CBC auto differential; Future; Expected date: 08/24/2017  -     Comprehensive metabolic panel; Future; Expected date: 08/24/2017  -     Protime-INR; Future; Expected date: 08/24/2017  -     KORTNEY; Future; Expected date: 08/24/2017  -     Antimitochondrial antibody; Future; Expected date: 08/24/2017  -     Anti-smooth muscle antibody; Future; Expected date: 08/24/2017  -     Ceruloplasmin; Future; Expected date: 08/24/2017  -     Ferritin; Future; Expected date: 08/24/2017  -     Iron and TIBC; Future; Expected date: 08/24/2017  -     Hepatitis A antibody, IgG; Future; Expected date: 08/24/2017  -     Hepatitis B surface antibody; Future; Expected date: 08/24/2017  -     Hepatitis C antibody; Future; Expected date: 08/24/2017    Fatty liver disease, nonalcoholic  -     CBC auto differential; Future; Expected date: 08/24/2017  -     Comprehensive metabolic panel; Future; Expected date: 08/24/2017  -     Protime-INR; Future; Expected date: 08/24/2017  -     KORTNEY; Future; Expected date: 08/24/2017  -     Antimitochondrial antibody; Future; Expected date: 08/24/2017  -     Anti-smooth muscle antibody; Future; Expected date: 08/24/2017  -     Ceruloplasmin; Future; Expected date: 08/24/2017  -     Ferritin; Future; Expected date: 08/24/2017  -     Iron and TIBC; Future; Expected date: 08/24/2017  -     Hepatitis A antibody, IgG; Future; Expected date: 08/24/2017  -     Hepatitis B surface antibody; Future; Expected date: 08/24/2017  -     Hepatitis C antibody; Future; Expected date: 08/24/2017        Follow up to be determined by results of labs.        Thank you so much for allowing me to participate in the care of SHERRI Carr

## 2017-08-24 NOTE — LETTER
August 24, 2017      Vin Thomson MD  [address] [place] [address]           [facility]  [address]  [place] [address]  Phone: [phone]  Fax: [phone]          Patient: Wilver Saba   MR Number: [id number]   YOB: 1995   Date of Visit: 8/24/2017       Dear Dr. Vin Thomson:    Thank you for referring Wilver Saba to me for evaluation. Attached you will find relevant portions of my assessment and plan of care.    If you have questions, please do not hesitate to call me. I look forward to following Wilver Sbaa along with you.    Sincerely,    Bonnie Christianson, [facility]    Enclosure  CC:  No Recipients    If you would like to receive this communication electronically, please contact [email or web address] or [phone] to request more information on [email or web address] Link access.    For providers and/or their staff who would like to refer a patient to [facility], please contact us through our one-stop-shop provider referral line, [facility], at [phone].    If you feel you have received this communication in error or would no longer like to receive these types of communications, please e-mail [email or web address]

## 2017-08-25 ENCOUNTER — LAB VISIT (OUTPATIENT)
Dept: LAB | Facility: HOSPITAL | Age: 22
End: 2017-08-25
Attending: NURSE PRACTITIONER
Payer: COMMERCIAL

## 2017-08-25 DIAGNOSIS — K76.0 FATTY LIVER DISEASE, NONALCOHOLIC: ICD-10-CM

## 2017-08-25 DIAGNOSIS — R74.8 ELEVATED LIVER ENZYMES: Primary | ICD-10-CM

## 2017-08-25 DIAGNOSIS — R74.8 ELEVATED LIVER ENZYMES: ICD-10-CM

## 2017-08-25 LAB
ANA SER QL IF: NORMAL
HBV SURFACE AB SER-ACNC: NEGATIVE M[IU]/ML
HCV AB SERPL QL IA: NEGATIVE
HEPATITIS A ANTIBODY, IGG: NEGATIVE
INR PPP: 1
MITOCHONDRIA AB TITR SER IF: NORMAL {TITER}
PROTHROMBIN TIME: 11.4 SEC
SMOOTH MUSCLE AB TITR SER IF: NORMAL {TITER}

## 2017-08-25 PROCEDURE — 36415 COLL VENOUS BLD VENIPUNCTURE: CPT | Mod: PO

## 2017-08-25 PROCEDURE — 85610 PROTHROMBIN TIME: CPT | Mod: PO

## 2017-09-01 ENCOUNTER — OFFICE VISIT (OUTPATIENT)
Dept: PSYCHIATRY | Facility: CLINIC | Age: 22
End: 2017-09-01
Payer: COMMERCIAL

## 2017-09-01 DIAGNOSIS — F33.1 MAJOR DEPRESSIVE DISORDER, RECURRENT EPISODE, MODERATE: Primary | ICD-10-CM

## 2017-09-01 DIAGNOSIS — F40.10 SOCIAL ANXIETY DISORDER: ICD-10-CM

## 2017-09-01 PROCEDURE — 90792 PSYCH DIAG EVAL W/MED SRVCS: CPT | Mod: S$GLB,,, | Performed by: PSYCHIATRY & NEUROLOGY

## 2017-09-01 RX ORDER — TEMAZEPAM 15 MG/1
15 CAPSULE ORAL NIGHTLY PRN
Qty: 30 CAPSULE | Refills: 1 | Status: SHIPPED | OUTPATIENT
Start: 2017-09-01 | End: 2017-10-01

## 2017-09-01 RX ORDER — DESVENLAFAXINE 100 MG/1
100 TABLET, EXTENDED RELEASE ORAL DAILY
Qty: 30 TABLET | Refills: 1 | Status: SHIPPED | OUTPATIENT
Start: 2017-09-01 | End: 2018-09-01
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
## 2017-09-05 ENCOUNTER — PATIENT MESSAGE (OUTPATIENT)
Dept: GASTROENTEROLOGY | Facility: CLINIC | Age: 22
End: 2017-09-05

## 2017-09-05 DIAGNOSIS — R74.8 ELEVATED LIVER ENZYMES: Primary | ICD-10-CM

## 2017-09-05 DIAGNOSIS — K76.0 FATTY LIVER: ICD-10-CM

## 2017-09-05 NOTE — TELEPHONE ENCOUNTER
Labs are consistent with fatty liver, however, given the length of time that the liver enzymes have been elevated and his family history, I suggest a liver biopsy for staging of fibrosis. Order placed.

## 2017-09-05 NOTE — TELEPHONE ENCOUNTER
Pt notified of test results. Pt advised that the specialty scheduling staff will call to schedule liver bx. Pt stated understanding. Requested Ally to call pt and schedule appt. She stated she would take care of it.

## 2017-09-06 ENCOUNTER — LAB VISIT (OUTPATIENT)
Dept: LAB | Facility: HOSPITAL | Age: 22
End: 2017-09-06
Attending: INTERNAL MEDICINE
Payer: COMMERCIAL

## 2017-09-06 ENCOUNTER — OFFICE VISIT (OUTPATIENT)
Dept: NEPHROLOGY | Facility: CLINIC | Age: 22
End: 2017-09-06
Payer: COMMERCIAL

## 2017-09-06 VITALS
BODY MASS INDEX: 43.52 KG/M2 | SYSTOLIC BLOOD PRESSURE: 116 MMHG | WEIGHT: 310.88 LBS | HEART RATE: 80 BPM | DIASTOLIC BLOOD PRESSURE: 84 MMHG | HEIGHT: 71 IN

## 2017-09-06 DIAGNOSIS — E66.01 MORBID OBESITY DUE TO EXCESS CALORIES: ICD-10-CM

## 2017-09-06 DIAGNOSIS — N28.1 COMPLEX RENAL CYST: ICD-10-CM

## 2017-09-06 DIAGNOSIS — K76.0 FATTY LIVER: ICD-10-CM

## 2017-09-06 DIAGNOSIS — I10 ESSENTIAL HYPERTENSION: ICD-10-CM

## 2017-09-06 DIAGNOSIS — R16.0 HEPATOMEGALY: ICD-10-CM

## 2017-09-06 DIAGNOSIS — E88.810 METABOLIC SYNDROME: ICD-10-CM

## 2017-09-06 DIAGNOSIS — N28.1 COMPLEX RENAL CYST: Primary | ICD-10-CM

## 2017-09-06 LAB
BILIRUB UR QL STRIP: NEGATIVE
CLARITY UR: CLEAR
COLOR UR: YELLOW
GLUCOSE UR QL STRIP: NEGATIVE
HGB UR QL STRIP: NEGATIVE
KETONES UR QL STRIP: NEGATIVE
LEUKOCYTE ESTERASE UR QL STRIP: NEGATIVE
NITRITE UR QL STRIP: NEGATIVE
PH UR STRIP: 8 [PH] (ref 5–8)
PROT UR QL STRIP: NEGATIVE
SP GR UR STRIP: 1.02 (ref 1–1.03)
URN SPEC COLLECT METH UR: NORMAL

## 2017-09-06 PROCEDURE — 3079F DIAST BP 80-89 MM HG: CPT | Mod: S$GLB,,, | Performed by: INTERNAL MEDICINE

## 2017-09-06 PROCEDURE — 3074F SYST BP LT 130 MM HG: CPT | Mod: S$GLB,,, | Performed by: INTERNAL MEDICINE

## 2017-09-06 PROCEDURE — 99205 OFFICE O/P NEW HI 60 MIN: CPT | Mod: S$GLB,,, | Performed by: INTERNAL MEDICINE

## 2017-09-06 PROCEDURE — 3008F BODY MASS INDEX DOCD: CPT | Mod: S$GLB,,, | Performed by: INTERNAL MEDICINE

## 2017-09-06 PROCEDURE — 81003 URINALYSIS AUTO W/O SCOPE: CPT | Mod: PO

## 2017-09-06 PROCEDURE — 99999 PR PBB SHADOW E&M-EST. PATIENT-LVL III: CPT | Mod: PBBFAC,,, | Performed by: INTERNAL MEDICINE

## 2017-09-06 NOTE — PROGRESS NOTES
NEPHROLOGY CLINIC CONSULTATION NOTE    REFERRING PHYSICIAN:  Dr. Vin Thomson.    REASON FOR CONSULTATION:  Incidental finding of a right renal cyst that is   complex.    HISTORY OF PRESENT ILLNESS:  Thank you for referring Mr. Saba to us.  As   you know, he is a 22-year-old  male with a pertinent history of   recently diagnosed hepatomegaly, likely suspected to fatty liver/GALARZA, who was   also found incidentally as part of his liver workup to have a complex right   renal cyst.  The patient has not had any back discomfort, no abdominal pain, has   never seen blood in the urine, no fever, no weight loss, no other pertinent   issues.  I saw and examined the patient today and I fully reviewed the medical   records.  He is a young man with significant medical history already related to   metabolic syndrome.  He was diagnosed with diabetes mellitus at the age of 14.    He has hypertension and he has morbid obesity.  He had a slight elevation in   transaminases, which lead to further workup.  A CT scan of the abdomen shows a   fatty liver.  He has been referred to GI.  A liver biopsy is being planned as   part of this workup, the right renal cyst was identified and he was referred to   us.  We spent some time talking about this renal cyst and possible referral to   Urology and obtaining an ultrasound of the kidneys.  We spent a great amount of   time, in fact talking about his metabolic syndrome and that is a greater risk to   his health than the renal cyst.  We also discussed diet, lifestyle changes and   his obesity.  It is noted that he is engaging in a very unhealthy eating   including excessive amounts of meat.    PAST MEDICAL HISTORY:  1.  Metabolic syndrome/morbid obesity.  2.  Diabetes mellitus diagnosed at the age of 14.  3.  Hypertension.  4.  Obstructive sleep apnea.  5.  Diverticulosis of the intestine without bleeding.  6.  Asthma.  7.  ADHD/hyperactivity syndrome.  8.  Depression.    PAST  SURGICAL HISTORY:  Right shoulder surgery for tear of right glenoid labrum,   tonsillectomy, and colon biopsy.    FAMILY HISTORY:  Mother with type 1 diabetes mellitus and osteoporosis.  Father   with hypertension.  Brother has liver cirrhosis, unclear.  The patient said it   may be related to brother taking Abilify.    SOCIAL HISTORY:  Negative for smoking.  Social use of alcohol.  No drugs.    ALLERGIES:  Reviewed, to amoxicillin, cefprozil, cephalosporins, Benadryl and   penicillin.    MEDICATIONS:  Reviewed.  Amlodipine 10 mg p.o. daily,   losartan/hydrochlorothiazide 100/25 one p.o. daily, metformin 1 g b.i.d.,   tramadol 50 mg as needed.    REVIEW OF SYSTEMS:  No recent hospitalizations.  GENERAL:  Negative.  HEAD, EYES, EARS, NOSE, AND THROAT:  Negative.  CARDIAC:  Negative.  PULMONARY:  Negative.  GASTROINTESTINAL:  As above, otherwise negative.  GENITOURINARY:  As above, otherwise negative.  PSYCHOLOGICAL:  Negative.  NEUROLOGICAL:  Negative.  ENDOCRINE:  Negative.  HEMATOLOGIC AND ONCOLOGIC:  Negative.  INFECTIOUS DISEASE:  Negative.  The rest of the review of systems negative.    PHYSICAL EXAMINATION:  VITAL SIGNS:  Blood pressure is 116/84, pulse 80, weight is 310 pounds.  GENERAL:  He is cooperative, pleasant.  Ambulating by himself, atraumatic,   normocephalic, well developed, well nourished.  Speech and thought process   appropriate, normal.  He is pleasant, intelligent man.  HEENT:  Mucous membranes moist.  NECK:  No JVD.  HEART:  Regular rate and rhythm, S1 and S2 audible.  No rubs.  CHEST:  Clear to auscultation.  No rales, no wheezes.  Breathing symmetric,   unlabored.  ABDOMEN:  Soft, nontender.  EXTREMITIES:  Showed no edema.    LABORATORY:  Reviewed.  Creatinine is 0.9, sodium 140, potassium 4.3, chloride   105, bicarbonate 25, calcium 9.6, albumin 4.0.  White count is 6.3, hemoglobin   14.9, platelets 273.  Noted liver workup including hepatitis C, B and A were   unremarkable.  Iron level was  normal.  Ceruloplasmin, anti-smooth muscle   antibody and antimitochondrial antibody as well as KORTNEY were unremarkable.  Also   noted that previously urinalysis about five months ago showed only trace   hematuria, no proteinuria.    IMAGING STUDIES:  Reviewed.  CT scan of the abdomen shows hepatomegaly and   evidence of fatty liver.  The right kidney has an incidental finding of a small   complex cyst about 2.5 cm.  Note was taken that there is an impression of mild   fullness of the bilateral renal pelvis without evidence of kidney stones or   obstruction.    ASSESSMENT AND PLAN:  This is a 22-year-old man with incidental finding of a   right complex renal cyst.  The patient has a fatty liver and metabolic syndrome.    The impression is as follows:  1.  Renal.  The right renal complex cyst is an incidental finding.  He has no   symptoms associated with this.  He had trace hematuria about four months ago.    UA will be repeated today.  We will follow up.  Typically patients with complex   renal cysts are seen by Urology for routine followup and I will refer him.  An   ultrasound of the kidney will also be ordered before his Urology appointment.  2.  Pertinent negative tests include a negative hepatitis C and B serologies.  3.  Metabolic syndrome was noted.  He has much more important problems that the   incidental finding of the renal cysts including metabolic syndrome, diabetes   mellitus, hypertension and obesity.  I spent a good amount of time trying to   encourage the patient to modify his diet and lifestyle.  I have advised him not   to eat excessive amounts of meat.  He says that his diet is such that it   consists mostly of meat and very little other things.  I have explained to him   that meat intake can eventually lead to kidney failure.  I have also explained   to him that it appears that he is engaging in hyperfiltration of the kidneys.    There is already evidence of fullness of the pelvis of kidneys,  which is   normally consistent with hyperfiltration.  He may develop secondary focal   segmental glomerulosclerosis secondary to obesity in future.  4.  Hypertension.  It is noted that he is already on antihypertensive   medications.  He has no proteinuria in previous urinalysis and he may not have   diabetic nephropathy at this stage.    PLANS AND RECOMMENDATIONS:  As above for each individual problem.  Opportunity   for questions and discussion provided.  Extensive time spent with the patient,   encouragement support provided.  Total time spent 60 minutes, more than 50% of   the time was spent in counseling and coordination of care.  Level V visit.    Return to see us in six months.      GUERITA  dd: 09/06/2017 16:45:01 (CDT)  td: 09/07/2017 21:17:17 (CDT)  Doc ID   #[id number]  Job ID #[id number]    CC:     [id number]

## 2017-09-06 NOTE — LETTER
September 6, 2017      Vin Thomson MD  [address] [place] [address]           [facility] - Nephrology  [address]  [place] [address]  Phone: [phone]  Fax: [phone]          Patient: Wilver Saba   MR Number: [id number]   YOB: 1995   Date of Visit: 9/6/2017       Dear Dr. Vin Thomson:    Thank you for referring Wilver Saba to me for evaluation. Attached you will find relevant portions of my assessment and plan of care.    If you have questions, please do not hesitate to call me. I look forward to following Wilver Saba along with you.    Sincerely,    Elana Yañez MD    Enclosure  CC:  No Recipients    If you would like to receive this communication electronically, please contact [email or web address] or [phone] to request more information on [employer] Link access.    For providers and/or their staff who would like to refer a patient to [facility], please contact us through our one-stop-shop provider referral line, [facility], at [phone].    If you feel you have received this communication in error or would no longer like to receive these types of communications, please e-mail [email or web address]

## 2018-01-15 RX ORDER — DESVENLAFAXINE 100 MG/1
100 TABLET, EXTENDED RELEASE ORAL DAILY
Qty: 30 TABLET | Refills: 0 | OUTPATIENT
Start: 2018-01-15 | End: 2019-01-15

## 2018-01-26 ENCOUNTER — PATIENT OUTREACH (OUTPATIENT)
Dept: ADMINISTRATIVE | Facility: HOSPITAL | Age: 23
End: 2018-01-26

## 2018-02-01 ENCOUNTER — PATIENT OUTREACH (OUTPATIENT)
Dept: ADMINISTRATIVE | Facility: HOSPITAL | Age: 23
End: 2018-02-01

## 2018-07-20 DIAGNOSIS — E11.9 TYPE 2 DIABETES MELLITUS WITHOUT COMPLICATION: ICD-10-CM

## 2018-12-07 ENCOUNTER — PATIENT OUTREACH (OUTPATIENT)
Dept: ADMINISTRATIVE | Facility: HOSPITAL | Age: 23
End: 2018-12-07

## 2020-04-02 ENCOUNTER — TELEPHONE (OUTPATIENT)
Dept: ADMINISTRATIVE | Facility: HOSPITAL | Age: 25
End: 2020-04-02